# Patient Record
Sex: FEMALE | Race: BLACK OR AFRICAN AMERICAN | Employment: UNEMPLOYED | URBAN - METROPOLITAN AREA
[De-identification: names, ages, dates, MRNs, and addresses within clinical notes are randomized per-mention and may not be internally consistent; named-entity substitution may affect disease eponyms.]

---

## 2017-07-11 ENCOUNTER — APPOINTMENT (OUTPATIENT)
Dept: GENERAL RADIOLOGY | Age: 22
End: 2017-07-11

## 2017-07-11 ENCOUNTER — HOSPITAL ENCOUNTER (EMERGENCY)
Age: 22
Discharge: HOME | End: 2017-07-11
Attending: EMERGENCY MEDICINE

## 2017-07-11 VITALS
RESPIRATION RATE: 20 BRPM | TEMPERATURE: 97.9 F | BODY MASS INDEX: 31.62 KG/M2 | SYSTOLIC BLOOD PRESSURE: 158 MMHG | WEIGHT: 190 LBS | OXYGEN SATURATION: 99 % | DIASTOLIC BLOOD PRESSURE: 95 MMHG | HEART RATE: 84 BPM
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
## 2017-07-11 DIAGNOSIS — S23.9XXA THORACIC SPRAIN: ICD-10-CM

## 2017-07-11 DIAGNOSIS — R10.9 ABDOMINAL PAIN, UNSPECIFIED LOCATION: ICD-10-CM

## 2017-07-11 DIAGNOSIS — S39.012A LUMBAR STRAIN, INITIAL ENCOUNTER: Primary | ICD-10-CM

## 2017-07-11 LAB
ABSOLUTE EOS #: 0.1 K/UL (ref 0–0.4)
ABSOLUTE LYMPH #: 2.1 K/UL (ref 1–4.8)
ABSOLUTE MONO #: 0.7 K/UL (ref 0.1–1.2)
ALBUMIN SERPL-MCNC: 3.8 G/DL (ref 3.5–5.2)
ALBUMIN/GLOBULIN RATIO: 1.2 (ref 1–2.5)
ALP BLD-CCNC: 65 U/L (ref 35–104)
ALT SERPL-CCNC: 11 U/L (ref 5–33)
ANION GAP SERPL CALCULATED.3IONS-SCNC: 12 MMOL/L (ref 9–17)
AST SERPL-CCNC: 16 U/L
BASOPHILS # BLD: 0 %
BASOPHILS ABSOLUTE: 0 K/UL (ref 0–0.2)
BILIRUB SERPL-MCNC: 0.34 MG/DL (ref 0.3–1.2)
BUN BLDV-MCNC: 9 MG/DL (ref 6–20)
BUN/CREAT BLD: NORMAL (ref 9–20)
CALCIUM SERPL-MCNC: 9.1 MG/DL (ref 8.6–10.4)
CHLORIDE BLD-SCNC: 104 MMOL/L (ref 98–107)
CO2: 22 MMOL/L (ref 20–31)
CREAT SERPL-MCNC: 0.63 MG/DL (ref 0.5–0.9)
DIFFERENTIAL TYPE: ABNORMAL
EOSINOPHILS RELATIVE PERCENT: 1 %
GFR AFRICAN AMERICAN: >60 ML/MIN
GFR NON-AFRICAN AMERICAN: >60 ML/MIN
GFR SERPL CREATININE-BSD FRML MDRD: NORMAL ML/MIN/{1.73_M2}
GFR SERPL CREATININE-BSD FRML MDRD: NORMAL ML/MIN/{1.73_M2}
GLUCOSE BLD-MCNC: 78 MG/DL (ref 70–99)
HCG QUALITATIVE: NEGATIVE
HCT VFR BLD CALC: 40.4 % (ref 36–46)
HEMOGLOBIN: 12.8 G/DL (ref 12–16)
LIPASE: 15 U/L (ref 13–60)
LYMPHOCYTES # BLD: 25 %
MCH RBC QN AUTO: 23.8 PG (ref 26–34)
MCHC RBC AUTO-ENTMCNC: 31.7 G/DL (ref 31–37)
MCV RBC AUTO: 75 FL (ref 80–100)
MONOCYTES # BLD: 8 %
PDW BLD-RTO: 15.3 % (ref 12.5–15.4)
PLATELET # BLD: 294 K/UL (ref 140–450)
PLATELET ESTIMATE: ABNORMAL
PMV BLD AUTO: 9.5 FL (ref 6–12)
POTASSIUM SERPL-SCNC: 4.1 MMOL/L (ref 3.7–5.3)
RBC # BLD: 5.39 M/UL (ref 4–5.2)
RBC # BLD: ABNORMAL 10*6/UL
SEG NEUTROPHILS: 66 %
SEGMENTED NEUTROPHILS ABSOLUTE COUNT: 5.5 K/UL (ref 1.8–7.7)
SODIUM BLD-SCNC: 138 MMOL/L (ref 135–144)
TOTAL PROTEIN: 7.1 G/DL (ref 6.4–8.3)
WBC # BLD: 8.4 K/UL (ref 3.5–11)
WBC # BLD: ABNORMAL 10*3/UL

## 2017-07-11 PROCEDURE — 73080 X-RAY EXAM OF ELBOW: CPT

## 2017-07-11 PROCEDURE — 80053 COMPREHEN METABOLIC PANEL: CPT

## 2017-07-11 PROCEDURE — 72072 X-RAY EXAM THORAC SPINE 3VWS: CPT

## 2017-07-11 PROCEDURE — 72100 X-RAY EXAM L-S SPINE 2/3 VWS: CPT

## 2017-07-11 PROCEDURE — 84703 CHORIONIC GONADOTROPIN ASSAY: CPT

## 2017-07-11 PROCEDURE — 83690 ASSAY OF LIPASE: CPT

## 2017-07-11 PROCEDURE — 6360000002 HC RX W HCPCS: Performed by: EMERGENCY MEDICINE

## 2017-07-11 PROCEDURE — 99284 EMERGENCY DEPT VISIT MOD MDM: CPT

## 2017-07-11 PROCEDURE — 85025 COMPLETE CBC W/AUTO DIFF WBC: CPT

## 2017-07-11 PROCEDURE — 96374 THER/PROPH/DIAG INJ IV PUSH: CPT

## 2017-07-11 RX ORDER — KETOROLAC TROMETHAMINE 15 MG/ML
15 INJECTION, SOLUTION INTRAMUSCULAR; INTRAVENOUS ONCE
Status: COMPLETED | OUTPATIENT
Start: 2017-07-11 | End: 2017-07-11

## 2017-07-11 RX ORDER — NAPROXEN 500 MG/1
500 TABLET ORAL 2 TIMES DAILY WITH MEALS
Qty: 30 TABLET | Refills: 0 | Status: SHIPPED | OUTPATIENT
Start: 2017-07-11 | End: 2019-12-14

## 2017-07-11 RX ADMIN — KETOROLAC TROMETHAMINE 15 MG: 15 INJECTION, SOLUTION INTRAMUSCULAR; INTRAVENOUS at 10:11

## 2017-07-11 ASSESSMENT — PAIN SCALES - GENERAL
PAINLEVEL_OUTOF10: 9
PAINLEVEL_OUTOF10: 9

## 2017-07-11 ASSESSMENT — PAIN DESCRIPTION - LOCATION: LOCATION: ARM;HEAD

## 2017-07-11 ASSESSMENT — ENCOUNTER SYMPTOMS
BACK PAIN: 1
ABDOMINAL PAIN: 1
SHORTNESS OF BREATH: 0
NAUSEA: 0
VOMITING: 0
WHEEZING: 0
COLOR CHANGE: 0

## 2017-07-11 ASSESSMENT — PAIN DESCRIPTION - DESCRIPTORS: DESCRIPTORS: HEADACHE

## 2017-07-11 ASSESSMENT — PAIN DESCRIPTION - ORIENTATION: ORIENTATION: RIGHT

## 2017-07-11 ASSESSMENT — PAIN DESCRIPTION - PAIN TYPE: TYPE: ACUTE PAIN

## 2018-03-19 ENCOUNTER — HOSPITAL ENCOUNTER (EMERGENCY)
Age: 23
Discharge: LEFT BEFORE DISCHARGE | End: 2018-03-19
Attending: EMERGENCY MEDICINE
Payer: MEDICAID

## 2018-03-19 VITALS
HEART RATE: 93 BPM | SYSTOLIC BLOOD PRESSURE: 150 MMHG | DIASTOLIC BLOOD PRESSURE: 99 MMHG | OXYGEN SATURATION: 98 % | TEMPERATURE: 97.2 F | RESPIRATION RATE: 16 BRPM

## 2018-03-19 DIAGNOSIS — R10.33 PERIUMBILICAL ABDOMINAL PAIN: Primary | ICD-10-CM

## 2018-03-19 PROCEDURE — 99284 EMERGENCY DEPT VISIT MOD MDM: CPT

## 2018-03-19 RX ORDER — DICYCLOMINE HYDROCHLORIDE 10 MG/1
10 CAPSULE ORAL ONCE
Status: DISCONTINUED | OUTPATIENT
Start: 2018-03-19 | End: 2018-03-19 | Stop reason: HOSPADM

## 2018-03-19 ASSESSMENT — PAIN DESCRIPTION - LOCATION: LOCATION: ABDOMEN

## 2018-03-19 ASSESSMENT — ENCOUNTER SYMPTOMS
NAUSEA: 0
DIARRHEA: 0
ABDOMINAL PAIN: 1
VOMITING: 0
SHORTNESS OF BREATH: 0
SORE THROAT: 0
CHEST TIGHTNESS: 0

## 2018-03-19 ASSESSMENT — PAIN DESCRIPTION - ORIENTATION: ORIENTATION: MID

## 2018-03-19 ASSESSMENT — PAIN DESCRIPTION - PAIN TYPE: TYPE: ACUTE PAIN

## 2018-03-19 ASSESSMENT — PAIN DESCRIPTION - FREQUENCY: FREQUENCY: CONTINUOUS

## 2018-03-19 ASSESSMENT — PAIN SCALES - GENERAL: PAINLEVEL_OUTOF10: 5

## 2018-03-19 ASSESSMENT — PAIN DESCRIPTION - DESCRIPTORS: DESCRIPTORS: SQUEEZING;SPASM

## 2018-03-19 NOTE — ED PROVIDER NOTES
[facility]     Emergency Department     Faculty Attestation        I performed a history and physical examination of the patient and discussed management with the resident. I reviewed the residents note and agree with the documented findings and plan of care. Any areas of disagreement are noted on the chart. I was personally present for the key portions of any procedures. I have documented in the chart those procedures where I was not present during the key portions. I have reviewed the emergency nurses triage note. I agree with the chief complaint, past medical history, past surgical history, allergies, medications, social and family history as documented unless otherwise noted below. For mid-level providers such as nurse practitioners as well as physicians assistants:    I have personally seen and evaluated the patient. I find the patient's history and physical exam are consistent with NP/PA documentation. I agree with the care provided, treatment rendered, disposition, & follow-up plan. Additional findings are as noted. Vital Signs: BP (!) 150/99   Pulse 93   Temp 97.2 °F (36.2 °C) (Oral)   Resp 16   SpO2 98%   PCP:  Mauricio Queen MD    Pertinent Comments:     Abdomen soft, nontender nondistended. Patient sitting up watching TV as he presented to eat and drink. Critical Care  None         Note, if the patient's blood pressure was elevated, and they have no history of hypertension, they were informed of the following: The patient may have Pre-hypertension/Hypertension: The patient has been informed that they may have pre-hypertension or Hypertension based on a blood pressure reading in the emergency department.  I recommend that the patient call the primary care provider listed on their discharge instructions or a physician of their choice this week to arrange follow up for further evaluation of possible pre-hypertension or Hypertension. (Please note that portions of this note were completed with a voice recognition program.  Efforts were made to edit the dictations but occasionally words are mis-transcribed. )    Winfield Gaucher, MD  Attending Emergency Medicine Physician              Delilah De La Cruz MD  03/19/18 [id number]

## 2018-03-19 NOTE — ED PROVIDER NOTES
Abortions Neg Hx     Stroke Neg Hx        Allergies:  Patient has no known allergies. Home Medications:  Prior to Admission medications    Medication Sig Start Date End Date Taking? Authorizing Provider   naproxen (NAPROSYN) 500 MG tablet Take 1 tablet by mouth 2 times daily (with meals) for 30 doses 7/11/17 7/26/17  Lien Diaz MD       REVIEW OF SYSTEMS    (2-9 systems for level 4, 10 or more for level 5)      Review of Systems   Constitutional: Negative for chills, diaphoresis and fever. HENT: Negative for congestion and sore throat. Respiratory: Negative for chest tightness and shortness of breath. Cardiovascular: Negative for chest pain. Gastrointestinal: Positive for abdominal pain. Negative for diarrhea, nausea and vomiting. Genitourinary: Negative for decreased urine volume, dysuria, frequency, urgency, vaginal bleeding and vaginal discharge. Musculoskeletal: Negative for arthralgias and neck pain. Skin: Negative for rash and wound. Neurological: Negative for weakness and numbness.        PHYSICAL EXAM   (up to 7 for level 4, 8 or more for level 5)      INITIAL VITALS:   BP (!) 150/99   Pulse 93   Temp 97.2 °F (36.2 °C) (Oral)   Resp 16   SpO2 98%     Physical Exam  CONSTITUTIONAL: AOx4, NAD, cooperative with exam, afebrile   HEAD: normocephalic, atraumatic   EYES: PERRL, EOMI, anicteric sclera   ENT: Moist mucous membranes, uvula midline   NECK: Supple, symmetric, trachea midline   BACK: Symmetric, no deformity   LUNGS: Bilateral breath sounds, CTAB, no rales/ronchi/wheezes   CARDIOVASCULAR: RRR, no m/r/g, 2+ pulses throughout   ABDOMEN: Soft, non-tender, non-distended, +BS   NEUROLOGIC:  MAEx4, normal sensorium and gait; normal strength throughout   MUSCULOSKELETAL: No clubbing, cyanosis or edema   SKIN: No rash, pallor or wounds          DIFFERENTIAL  DIAGNOSIS     PLAN (LABS / IMAGING / EKG):  Orders Placed This Encounter   Procedures    Urinalysis with microscopic   

## 2018-05-19 ENCOUNTER — HOSPITAL ENCOUNTER (EMERGENCY)
Age: 23
Discharge: HOME | End: 2018-05-19
Attending: EMERGENCY MEDICINE
Payer: MEDICAID

## 2018-05-19 VITALS
WEIGHT: 210 LBS | SYSTOLIC BLOOD PRESSURE: 139 MMHG | DIASTOLIC BLOOD PRESSURE: 90 MMHG | OXYGEN SATURATION: 99 % | HEART RATE: 86 BPM | TEMPERATURE: 98.1 F | BODY MASS INDEX: 34.95 KG/M2 | RESPIRATION RATE: 12 BRPM

## 2018-05-19 DIAGNOSIS — M54.32 SCIATICA OF LEFT SIDE: Primary | ICD-10-CM

## 2018-05-19 PROCEDURE — 6360000002 HC RX W HCPCS: Performed by: STUDENT IN AN ORGANIZED HEALTH CARE EDUCATION/TRAINING PROGRAM

## 2018-05-19 PROCEDURE — 96372 THER/PROPH/DIAG INJ SC/IM: CPT

## 2018-05-19 PROCEDURE — 99283 EMERGENCY DEPT VISIT LOW MDM: CPT

## 2018-05-19 RX ORDER — IBUPROFEN 400 MG/1
800 TABLET ORAL EVERY 8 HOURS PRN
Qty: 20 TABLET | Refills: 0 | Status: SHIPPED | OUTPATIENT
Start: 2018-05-19 | End: 2019-05-22

## 2018-05-19 RX ORDER — KETOROLAC TROMETHAMINE 30 MG/ML
30 INJECTION, SOLUTION INTRAMUSCULAR; INTRAVENOUS ONCE
Status: COMPLETED | OUTPATIENT
Start: 2018-05-19 | End: 2018-05-19

## 2018-05-19 RX ORDER — CYCLOBENZAPRINE HCL 10 MG
10 TABLET ORAL ONCE
Status: DISCONTINUED | OUTPATIENT
Start: 2018-05-19 | End: 2018-05-19

## 2018-05-19 RX ORDER — CYCLOBENZAPRINE HCL 10 MG
10 TABLET ORAL 3 TIMES DAILY PRN
Qty: 20 TABLET | Refills: 0 | Status: SHIPPED | OUTPATIENT
Start: 2018-05-19 | End: 2019-12-14

## 2018-05-19 RX ADMIN — KETOROLAC TROMETHAMINE 30 MG: 30 INJECTION, SOLUTION INTRAMUSCULAR at 19:44

## 2018-05-19 ASSESSMENT — PAIN DESCRIPTION - ORIENTATION: ORIENTATION: LOWER

## 2018-05-19 ASSESSMENT — ENCOUNTER SYMPTOMS
DIARRHEA: 0
WHEEZING: 0
SHORTNESS OF BREATH: 0
ABDOMINAL PAIN: 0
BACK PAIN: 1
NAUSEA: 0
SORE THROAT: 0
COUGH: 0
CONSTIPATION: 0
VOMITING: 0
CHEST TIGHTNESS: 0

## 2018-05-19 ASSESSMENT — PAIN DESCRIPTION - PAIN TYPE: TYPE: ACUTE PAIN

## 2018-05-19 ASSESSMENT — PAIN DESCRIPTION - DESCRIPTORS: DESCRIPTORS: DISCOMFORT

## 2018-05-19 ASSESSMENT — PAIN DESCRIPTION - FREQUENCY: FREQUENCY: CONTINUOUS

## 2018-05-19 ASSESSMENT — PAIN SCALES - GENERAL
PAINLEVEL_OUTOF10: 9
PAINLEVEL_OUTOF10: 9

## 2018-05-19 ASSESSMENT — PAIN DESCRIPTION - LOCATION: LOCATION: BACK

## 2019-05-22 ENCOUNTER — HOSPITAL ENCOUNTER (EMERGENCY)
Age: 24
Discharge: HOME | End: 2019-05-23
Attending: EMERGENCY MEDICINE
Payer: COMMERCIAL

## 2019-05-22 ENCOUNTER — APPOINTMENT (OUTPATIENT)
Dept: GENERAL RADIOLOGY | Age: 24
End: 2019-05-22
Payer: COMMERCIAL

## 2019-05-22 VITALS
HEIGHT: 62 IN | SYSTOLIC BLOOD PRESSURE: 155 MMHG | OXYGEN SATURATION: 99 % | DIASTOLIC BLOOD PRESSURE: 100 MMHG | TEMPERATURE: 98 F | RESPIRATION RATE: 18 BRPM | BODY MASS INDEX: 42.33 KG/M2 | WEIGHT: 230 LBS | HEART RATE: 75 BPM

## 2019-05-22 DIAGNOSIS — M62.838 TRAPEZIUS MUSCLE SPASM: ICD-10-CM

## 2019-05-22 DIAGNOSIS — M54.6 PAIN IN THORACIC SPINE: ICD-10-CM
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
## 2019-05-22 DIAGNOSIS — M54.50 LUMBAR PAIN: ICD-10-CM

## 2019-05-22 DIAGNOSIS — S06.0X0A CONCUSSION WITHOUT LOSS OF CONSCIOUSNESS, INITIAL ENCOUNTER: ICD-10-CM

## 2019-05-22 LAB — PREGNANCY TEST URINE, POC: NEGATIVE

## 2019-05-22 PROCEDURE — 72100 X-RAY EXAM L-S SPINE 2/3 VWS: CPT

## 2019-05-22 PROCEDURE — 99284 EMERGENCY DEPT VISIT MOD MDM: CPT

## 2019-05-22 PROCEDURE — 72072 X-RAY EXAM THORAC SPINE 3VWS: CPT

## 2019-05-22 PROCEDURE — 6370000000 HC RX 637 (ALT 250 FOR IP): Performed by: EMERGENCY MEDICINE

## 2019-05-22 RX ORDER — ACETAMINOPHEN 325 MG/1
650 TABLET ORAL EVERY 6 HOURS PRN
Qty: 30 TABLET | Refills: 0 | Status: SHIPPED | OUTPATIENT
Start: 2019-05-22 | End: 2019-12-06

## 2019-05-22 RX ORDER — IBUPROFEN 400 MG/1
400 TABLET ORAL EVERY 8 HOURS PRN
Qty: 30 TABLET | Refills: 0 | Status: SHIPPED | OUTPATIENT
Start: 2019-05-22 | End: 2019-12-06

## 2019-05-22 RX ORDER — ACETAMINOPHEN 500 MG
1000 TABLET ORAL ONCE
Status: COMPLETED | OUTPATIENT
Start: 2019-05-22 | End: 2019-05-22

## 2019-05-22 RX ADMIN — ACETAMINOPHEN 1000 MG: 500 TABLET ORAL at 23:53

## 2019-05-22 ASSESSMENT — PAIN SCALES - GENERAL: PAINLEVEL_OUTOF10: 8

## 2019-05-22 ASSESSMENT — PAIN DESCRIPTION - DESCRIPTORS: DESCRIPTORS: ACHING

## 2019-05-22 ASSESSMENT — PAIN DESCRIPTION - LOCATION: LOCATION: NECK

## 2019-05-22 ASSESSMENT — PAIN DESCRIPTION - FREQUENCY: FREQUENCY: CONTINUOUS
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
## 2019-10-18 ENCOUNTER — HOSPITAL ENCOUNTER (EMERGENCY)
Age: 24
Discharge: HOME | End: 2019-10-18
Attending: EMERGENCY MEDICINE
Payer: MEDICAID

## 2019-10-18 VITALS
BODY MASS INDEX: 30.99 KG/M2 | TEMPERATURE: 98.2 F | HEIGHT: 65 IN | RESPIRATION RATE: 18 BRPM | OXYGEN SATURATION: 100 % | WEIGHT: 186 LBS | DIASTOLIC BLOOD PRESSURE: 104 MMHG | HEART RATE: 81 BPM | SYSTOLIC BLOOD PRESSURE: 148 MMHG

## 2019-10-18 DIAGNOSIS — R23.4 CHANGE OF SKIN OF BREAST: Primary | ICD-10-CM

## 2019-10-18 LAB
CHP ED QC CHECK: NORMAL
PREGNANCY TEST URINE, POC: NEGATIVE

## 2019-10-18 PROCEDURE — 6370000000 HC RX 637 (ALT 250 FOR IP): Performed by: EMERGENCY MEDICINE

## 2019-10-18 PROCEDURE — 81025 URINE PREGNANCY TEST: CPT

## 2019-10-18 PROCEDURE — 99284 EMERGENCY DEPT VISIT MOD MDM: CPT

## 2019-10-18 RX ORDER — CEPHALEXIN 500 MG/1
500 CAPSULE ORAL 4 TIMES DAILY
Qty: 28 CAPSULE | Refills: 0 | Status: SHIPPED | OUTPATIENT
Start: 2019-10-18 | End: 2019-10-25

## 2019-10-18 RX ORDER — CEPHALEXIN 500 MG/1
500 CAPSULE ORAL ONCE
Status: COMPLETED | OUTPATIENT
Start: 2019-10-18 | End: 2019-10-18

## 2019-10-18 RX ADMIN — CEPHALEXIN 500 MG: 500 CAPSULE ORAL at 07:15

## 2019-10-18 ASSESSMENT — ENCOUNTER SYMPTOMS
SHORTNESS OF BREATH: 0
COUGH: 1
CONSTIPATION: 0
TROUBLE SWALLOWING: 0
COLOR CHANGE: 1
NAUSEA: 0
DIARRHEA: 0
VOMITING: 0
PHOTOPHOBIA: 0
ABDOMINAL PAIN: 0

## 2019-10-21 LAB — HCG, PREGNANCY URINE (POC): NEGATIVE

## 2019-12-05 ENCOUNTER — HOSPITAL ENCOUNTER (EMERGENCY)
Age: 24
Discharge: LEFT BEFORE DISCHARGE | End: 2019-12-05
Payer: MEDICAID

## 2019-12-06 ENCOUNTER — APPOINTMENT (OUTPATIENT)
Dept: GENERAL RADIOLOGY | Age: 24
End: 2019-12-06
Payer: MEDICAID

## 2019-12-06 ENCOUNTER — HOSPITAL ENCOUNTER (EMERGENCY)
Age: 24
Discharge: HOME | End: 2019-12-06
Attending: EMERGENCY MEDICINE
Payer: MEDICAID

## 2019-12-06 VITALS
TEMPERATURE: 97.9 F | HEIGHT: 65 IN | WEIGHT: 186 LBS | OXYGEN SATURATION: 99 % | BODY MASS INDEX: 30.99 KG/M2 | HEART RATE: 73 BPM | DIASTOLIC BLOOD PRESSURE: 84 MMHG | RESPIRATION RATE: 16 BRPM | SYSTOLIC BLOOD PRESSURE: 132 MMHG

## 2019-12-06 DIAGNOSIS — L03.116 CELLULITIS OF LEFT FOOT: Primary | ICD-10-CM

## 2019-12-06 PROCEDURE — 6370000000 HC RX 637 (ALT 250 FOR IP): Performed by: STUDENT IN AN ORGANIZED HEALTH CARE EDUCATION/TRAINING PROGRAM

## 2019-12-06 PROCEDURE — 96372 THER/PROPH/DIAG INJ SC/IM: CPT

## 2019-12-06 PROCEDURE — 99283 EMERGENCY DEPT VISIT LOW MDM: CPT

## 2019-12-06 PROCEDURE — 73630 X-RAY EXAM OF FOOT: CPT

## 2019-12-06 PROCEDURE — 6360000002 HC RX W HCPCS: Performed by: STUDENT IN AN ORGANIZED HEALTH CARE EDUCATION/TRAINING PROGRAM

## 2019-12-06 RX ORDER — ACETAMINOPHEN 500 MG
1000 TABLET ORAL ONCE
Status: COMPLETED | OUTPATIENT
Start: 2019-12-06 | End: 2019-12-06

## 2019-12-06 RX ORDER — IBUPROFEN 600 MG/1
600 TABLET ORAL EVERY 6 HOURS PRN
Qty: 30 TABLET | Refills: 0 | Status: SHIPPED | OUTPATIENT
Start: 2019-12-06 | End: 2019-12-14

## 2019-12-06 RX ORDER — KETOROLAC TROMETHAMINE 30 MG/ML
30 INJECTION, SOLUTION INTRAMUSCULAR; INTRAVENOUS ONCE
Status: DISCONTINUED | OUTPATIENT
Start: 2019-12-06 | End: 2019-12-06

## 2019-12-06 RX ORDER — KETOROLAC TROMETHAMINE 30 MG/ML
30 INJECTION, SOLUTION INTRAMUSCULAR; INTRAVENOUS ONCE
Status: COMPLETED | OUTPATIENT
Start: 2019-12-06 | End: 2019-12-06

## 2019-12-06 RX ORDER — CEPHALEXIN 500 MG/1
500 CAPSULE ORAL 2 TIMES DAILY
Qty: 14 CAPSULE | Refills: 0 | Status: SHIPPED | OUTPATIENT
Start: 2019-12-06 | End: 2019-12-13

## 2019-12-06 RX ADMIN — ACETAMINOPHEN 1000 MG: 500 TABLET ORAL at 08:42

## 2019-12-06 RX ADMIN — KETOROLAC TROMETHAMINE 30 MG: 30 INJECTION, SOLUTION INTRAMUSCULAR; INTRAVENOUS at 08:42

## 2019-12-06 ASSESSMENT — ENCOUNTER SYMPTOMS
EYE REDNESS: 0
APNEA: 0
DIARRHEA: 0
VOMITING: 0
EYE PAIN: 0
COUGH: 0
RHINORRHEA: 0
NAUSEA: 0
BLOOD IN STOOL: 0
SINUS PAIN: 0
WHEEZING: 0
SHORTNESS OF BREATH: 0
ABDOMINAL PAIN: 0

## 2019-12-06 ASSESSMENT — PAIN DESCRIPTION - PAIN TYPE: TYPE: ACUTE PAIN

## 2019-12-06 ASSESSMENT — PAIN SCALES - GENERAL
PAINLEVEL_OUTOF10: 10
PAINLEVEL_OUTOF10: 10

## 2019-12-06 ASSESSMENT — PAIN DESCRIPTION - LOCATION: LOCATION: FOOT

## 2019-12-14 ENCOUNTER — APPOINTMENT (OUTPATIENT)
Dept: GENERAL RADIOLOGY | Age: 24
End: 2019-12-14
Payer: MEDICAID

## 2019-12-14 ENCOUNTER — HOSPITAL ENCOUNTER (EMERGENCY)
Age: 24
Discharge: HOME | End: 2019-12-14
Attending: EMERGENCY MEDICINE
Payer: MEDICAID

## 2019-12-14 VITALS
OXYGEN SATURATION: 100 % | HEIGHT: 65 IN | RESPIRATION RATE: 16 BRPM | SYSTOLIC BLOOD PRESSURE: 144 MMHG | BODY MASS INDEX: 41.73 KG/M2 | TEMPERATURE: 98.1 F | DIASTOLIC BLOOD PRESSURE: 93 MMHG | HEART RATE: 70 BPM | WEIGHT: 250.5 LBS

## 2019-12-14 DIAGNOSIS — S16.1XXA ACUTE STRAIN OF NECK MUSCLE, INITIAL ENCOUNTER: ICD-10-CM
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
## 2019-12-14 PROCEDURE — 99284 EMERGENCY DEPT VISIT MOD MDM: CPT

## 2019-12-14 PROCEDURE — 72040 X-RAY EXAM NECK SPINE 2-3 VW: CPT

## 2019-12-14 RX ORDER — CYCLOBENZAPRINE HCL 10 MG
10 TABLET ORAL NIGHTLY PRN
Qty: 10 TABLET | Refills: 0 | Status: SHIPPED | OUTPATIENT
Start: 2019-12-14 | End: 2019-12-24

## 2019-12-14 RX ORDER — IBUPROFEN 600 MG/1
600 TABLET ORAL 4 TIMES DAILY PRN
Qty: 360 TABLET | Refills: 0 | Status: SHIPPED | OUTPATIENT
Start: 2019-12-14 | End: 2020-03-01

## 2019-12-14 ASSESSMENT — PAIN DESCRIPTION - FREQUENCY: FREQUENCY: CONTINUOUS

## 2019-12-14 ASSESSMENT — PAIN SCALES - GENERAL
PAINLEVEL_OUTOF10: 8
PAINLEVEL_OUTOF10: 8

## 2019-12-14 ASSESSMENT — PAIN DESCRIPTION - DESCRIPTORS: DESCRIPTORS: ACHING;THROBBING

## 2019-12-15 ASSESSMENT — ENCOUNTER SYMPTOMS
BACK PAIN: 0
COLOR CHANGE: 0
EYE PAIN: 0
ABDOMINAL PAIN: 0
NAUSEA: 0
VOMITING: 0
WHEEZING: 0
CHOKING: 0
SHORTNESS OF BREATH: 0

## 2020-03-01 ENCOUNTER — HOSPITAL ENCOUNTER (EMERGENCY)
Age: 25
Discharge: HOME | End: 2020-03-01
Attending: EMERGENCY MEDICINE
Payer: MEDICAID

## 2020-03-01 ENCOUNTER — APPOINTMENT (OUTPATIENT)
Dept: GENERAL RADIOLOGY | Age: 25
End: 2020-03-01
Payer: MEDICAID

## 2020-03-01 VITALS
HEIGHT: 65 IN | DIASTOLIC BLOOD PRESSURE: 99 MMHG | HEART RATE: 89 BPM | WEIGHT: 155 LBS | BODY MASS INDEX: 25.83 KG/M2 | RESPIRATION RATE: 19 BRPM | TEMPERATURE: 98.7 F | SYSTOLIC BLOOD PRESSURE: 149 MMHG

## 2020-03-01 PROCEDURE — 73030 X-RAY EXAM OF SHOULDER: CPT

## 2020-03-01 PROCEDURE — 96372 THER/PROPH/DIAG INJ SC/IM: CPT

## 2020-03-01 PROCEDURE — 99283 EMERGENCY DEPT VISIT LOW MDM: CPT

## 2020-03-01 PROCEDURE — 6360000002 HC RX W HCPCS: Performed by: NURSE PRACTITIONER

## 2020-03-01 PROCEDURE — 73080 X-RAY EXAM OF ELBOW: CPT

## 2020-03-01 RX ORDER — CYCLOBENZAPRINE HCL 5 MG
5 TABLET ORAL 2 TIMES DAILY PRN
Qty: 10 TABLET | Refills: 0 | Status: SHIPPED | OUTPATIENT
Start: 2020-03-01 | End: 2020-03-11

## 2020-03-01 RX ORDER — KETOROLAC TROMETHAMINE 30 MG/ML
30 INJECTION, SOLUTION INTRAMUSCULAR; INTRAVENOUS ONCE
Status: COMPLETED | OUTPATIENT
Start: 2020-03-01 | End: 2020-03-01

## 2020-03-01 RX ORDER — IBUPROFEN 800 MG/1
800 TABLET ORAL EVERY 8 HOURS PRN
Qty: 30 TABLET | Refills: 0 | Status: SHIPPED | OUTPATIENT
Start: 2020-03-01 | End: 2020-12-18

## 2020-03-01 RX ADMIN — KETOROLAC TROMETHAMINE 30 MG: 30 INJECTION, SOLUTION INTRAMUSCULAR at 20:48

## 2020-03-01 ASSESSMENT — PAIN SCALES - GENERAL
PAINLEVEL_OUTOF10: 10
PAINLEVEL_OUTOF10: 10

## 2020-03-01 ASSESSMENT — ENCOUNTER SYMPTOMS
CHEST TIGHTNESS: 0
ABDOMINAL DISTENTION: 0
VOMITING: 0
ABDOMINAL PAIN: 0
VOICE CHANGE: 0
EYE PAIN: 0
SORE THROAT: 0
COUGH: 0
BACK PAIN: 0
PHOTOPHOBIA: 0
TROUBLE SWALLOWING: 0
DIARRHEA: 0
NAUSEA: 0
FACIAL SWELLING: 0
SHORTNESS OF BREATH: 0

## 2020-03-01 ASSESSMENT — PAIN DESCRIPTION - PAIN TYPE: TYPE: ACUTE PAIN

## 2020-03-01 ASSESSMENT — PAIN DESCRIPTION - ORIENTATION: ORIENTATION: LEFT

## 2020-03-01 ASSESSMENT — PAIN DESCRIPTION - LOCATION: LOCATION: ARM
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
## 2020-03-02 NOTE — ED PROVIDER NOTES
Patient accepted at shift change. The patient's ED course and anticipated disposition was discussed. Relevant labs and other studies were reviewed. Pending diagnostic and therapeutic workup was discussed. XR SHOULDER LEFT (MIN 2 VIEWS)   Final Result   No acute osseous abnormality involving the left shoulder         XR ELBOW LEFT (MIN 3 VIEWS)   Final Result   No acute osseous abnormality of the left elbow.                 Saray Gomez MD  03/01/20 5670

## 2020-03-02 NOTE — ED PROVIDER NOTES
[facility]  ED  Emergency Department Encounter  Advanced Practice Provider     Pt Name: Shell Stanford  MRN: [id number]  [place] 1995  Date of evaluation: 3/1/20  PCP:  No primary care provider on file. CHIEF COMPLAINT       Chief Complaint   Patient presents with    Arm Pain       HISTORY OF PRESENT ILLNESS  (Location/Symptom, Timing/Onset,Context/Setting, Quality, Duration, Modifying Factors, Severity.)      Codi Barlow is a 25 y.o. female who presents with left arm pain. Patient states she went out last night and had some margaritas and is not sure if she did something to her arm. Patient states that she woke up today and her left shoulder down through her elbow was in excruciating pain. She states she attempted Tylenol as well as Motrin with minimal relief. She put some ice on it as well. There is no obvious deformity. She cannot recall anything specifically that would have caused this pain. She has issues with range of motion due to the pain and is holding her arm at the forearm. She denies any previous injury to this arm. She denies any numbness or tingling currently. She denies any neck or back pain. PAST MEDICAL /SURGICAL / SOCIAL / FAMILY HISTORY      has a past medical history of Anemia, Hypertension, and Trauma. She denies any past surgical history at this time.     Social History     Socioeconomic History    Marital status: Single     Spouse name: Not on file    Number of children: Not on file    Years of education: Not on file    Highest education level: Not on file   Occupational History    Not on file   Social Needs    Financial resource strain: Not on file    Food insecurity:     Worry: Not on file     Inability: Not on file    Transportation needs:     Medical: Not on file     Non-medical: Not on file   Tobacco Use    Smoking status: Never Smoker    Smokeless tobacco: Never Used   Substance and Sexual Activity    Alcohol use: No    Drug use: No    Sexual activity: Not Currently     Partners: Male   Lifestyle    Physical activity:     Days per week: Not on file     Minutes per session: Not on file    Stress: Not on file   Relationships    Social connections:     Talks on phone: Not on file     Gets together: Not on file     Attends Buddhism service: Not on file     Active member of club or organization: Not on file     Attends meetings of clubs or organizations: Not on file     Relationship status: Not on file    Intimate partner violence:     Fear of current or ex partner: Not on file     Emotionally abused: Not on file     Physically abused: Not on file     Forced sexual activity: Not on file   Other Topics Concern    Not on file   Social History Narrative    Not on file       Family History   Problem Relation Age of Onset    Diabetes Maternal Grandmother     Hypertension Maternal Grandmother     Hypertension Mother     Breast Cancer Neg Hx     Cancer Neg Hx     Colon Cancer Neg Hx     Eclampsia Neg Hx     Ovarian Cancer Neg Hx      Labor Neg Hx     Spont Abortions Neg Hx     Stroke Neg Hx        Allergies:  Patient has no known allergies. Home Medications:  Prior to Admission medications    Medication Sig Start Date End Date Taking? Authorizing Provider   ibuprofen (IBU) 800 MG tablet Take 1 tablet by mouth every 8 hours as needed for Pain 3/1/20  Yes JUANY Tidwell CNP   cyclobenzaprine (FLEXERIL) 5 MG tablet Take 1 tablet by mouth 2 times daily as needed for Muscle spasms 3/1/20 3/11/20 Yes JUANY Tidwell CNP       patient's medication list has been reviewed as entered by the nursing staff. REVIEW OF SYSTEMS    (2-9 systems for level 4, 10 or more for level 5)      Review of Systems   Constitutional: Negative for chills, diaphoresis, fatigue and fever. HENT: Negative for facial swelling, sore throat, tinnitus, trouble swallowing and voice change.     Eyes: Negative for photophobia, pain and visual disturbance. Respiratory: Negative for cough, chest tightness and shortness of breath. Cardiovascular: Negative for chest pain and palpitations. Gastrointestinal: Negative for abdominal distention, abdominal pain, diarrhea, nausea and vomiting. Endocrine: Negative for polydipsia, polyphagia and polyuria. Genitourinary: Negative for dysuria and flank pain. Musculoskeletal: Positive for arthralgias (left shoulder, upper arm, and elbow pain) and myalgias (pain in left bicep). Negative for back pain, joint swelling and neck pain. Skin: Negative for pallor, rash and wound. Neurological: Negative for dizziness, syncope, numbness and headaches. Psychiatric/Behavioral: Negative for confusion and decreased concentration. PHYSICAL EXAM  (up to 7 for level 4, 8 or more for level 5)      INITIAL VITALS:  height is 5' 5\" (1.651 m) and weight is 155 lb (70.3 kg). Her temperature is 98.7 °F (37.1 °C). Her blood pressure is 149/99 (abnormal) and her pulse is 89. Her respiration is 19. Physical Exam  Vitals signs and nursing note reviewed. Constitutional:       General: She is in acute distress. Appearance: Normal appearance. She is not ill-appearing, toxic-appearing or diaphoretic. HENT:      Head: Normocephalic and atraumatic. Nose: Nose normal.      Mouth/Throat:      Mouth: Mucous membranes are moist.      Pharynx: Oropharynx is clear. Eyes:      Extraocular Movements: Extraocular movements intact. Conjunctiva/sclera: Conjunctivae normal.   Neck:      Musculoskeletal: Normal range of motion. Cardiovascular:      Rate and Rhythm: Normal rate. Pulses: Normal pulses. Heart sounds: Normal heart sounds. Pulmonary:      Effort: Pulmonary effort is normal. No respiratory distress. Breath sounds: Normal breath sounds. Musculoskeletal:      Left shoulder: She exhibits decreased range of motion, tenderness, pain and decreased strength (due to pain).  She exhibits no bony tenderness, no swelling, no effusion, no crepitus, no deformity and normal pulse. Left elbow: She exhibits decreased range of motion. She exhibits no swelling, no effusion and no deformity. Tenderness found. Medial epicondyle tenderness noted. Left wrist: Normal.      Left upper arm: She exhibits tenderness. Left forearm: Normal.      Left hand: Normal.      Comments: Patient having pain with pronation and supination of the left lower arm. Pain with abduction of the left shoulder. Pain and spasming in the posterior left shoulder as well. No erythema, warmth or swelling to any joints. No obvious deformity. Capillary refill less than 2 seconds. Decreased strength due to causing pain. Skin:     General: Skin is warm and dry. Capillary Refill: Capillary refill takes less than 2 seconds. Neurological:      Mental Status: She is alert and oriented to person, place, and time. Sensory: Sensation is intact. Motor: Motor function is intact. Coordination: Coordination is intact. Psychiatric:         Mood and Affect: Mood normal.         Behavior: Behavior normal.         Thought Content:  Thought content normal.         Judgment: Judgment normal.       PLAN (LABS / IMAGING / EKG):  Orders Placed This Encounter   Procedures    XR SHOULDER LEFT (MIN 2 VIEWS)    XR ELBOW LEFT (MIN 3 VIEWS)       MEDICATIONS ORDERED:  Orders Placed This Encounter   Medications    ketorolac (TORADOL) injection 30 mg    ibuprofen (IBU) 800 MG tablet     Sig: Take 1 tablet by mouth every 8 hours as needed for Pain     Dispense:  30 tablet     Refill:  0    cyclobenzaprine (FLEXERIL) 5 MG tablet     Sig: Take 1 tablet by mouth 2 times daily as needed for Muscle spasms     Dispense:  10 tablet     Refill:  0       Controlled Substances Monitoring:       Differential Diagnoses:  Dislocation  Fracture  Strain    DIAGNOSTIC RESULTS / EMERGENCY DEPARTMENT COURSE / MDM     Patient here Awilda Watts, [address]  [address] [facility]  [phone]    Schedule an appointment as soon as possible for a visit       [facility]  [address] [place] [address]  [phone]  Go to   As needed, If symptoms worsen      DISCHARGE MEDICATIONS:  New Prescriptions    CYCLOBENZAPRINE (FLEXERIL) 5 MG TABLET    Take 1 tablet by mouth 2 times daily as needed for Muscle spasms    IBUPROFEN (IBU) 800 MG TABLET    Take 1 tablet by mouth every 8 hours as needed for Pain       JUANY Hyatt CNP   Emergency Medicine Physician Assistant    (Please note that portions of this note were completed with a voice recognition program.  Efforts were made to edit thedictations but occasionally words are mis-transcribed.)       JUANY Hyatt CNP  03/01/20 9640

## 2020-11-19 ENCOUNTER — OFFICE VISIT (OUTPATIENT)
Dept: ORTHOPEDIC SURGERY | Age: 25
End: 2020-11-19
Payer: MEDICAID

## 2020-11-19 PROCEDURE — 99203 OFFICE O/P NEW LOW 30 MIN: CPT | Performed by: ORTHOPAEDIC SURGERY

## 2020-11-19 NOTE — PROGRESS NOTES
[facility]  [facility]  [address] [address]  Dept: [phone]    Orthopaedic New Patient    CHIEF COMPLAINT:    Chief Complaint   Patient presents with    Foot Pain     bilateral       HISTORY OF PRESENT ILLNESS:    The patient is a 22 y.o. female who is being seen as a new patient for bilateral feet pain. Patient reports that approximately 4 months ago she noticed pain began at the plantar aspect of her foot at the site of her callus. Patient states that she has a family history of foot calluses which were treated with debridement. Denies any previous orthopedic trauma. Patient used to work at [employer] but had to quit due to the significant pain in her bilateral feet while on them. Denies any numbness/tingling. Denies history of diabetes. Otherwise, patient has not orthopedic complaints at this time. Past Medical History:    Past Medical History:   Diagnosis Date    Anemia 4/2/2014    Hypertension 4/30/2014    Trauma     slip/fall in pregnancy       Past Surgical History:    No past surgical history on file. Current Medications:   Current Outpatient Medications   Medication Sig Dispense Refill    ibuprofen (IBU) 800 MG tablet Take 1 tablet by mouth every 8 hours as needed for Pain 30 tablet 0     Current Facility-Administered Medications   Medication Dose Route Frequency Provider Last Rate Last Dose    medroxyPROGESTERone (DEPO-PROVERA) injection 150 mg  150 mg Intramuscular Q3 Months Kemar Hua DO   150 mg at 12/07/15 1609    medroxyPROGESTERone (DEPO-PROVERA) injection 150 mg  150 mg Intramuscular See Admin Instructions Korina Lepe DO   150 mg at 04/08/15 1132       Allergies:    Patient has no known allergies.     Social History:   Social History     Socioeconomic History    Marital status: Single     Spouse name: Not on file    Number of children: Not on file    Years of education: Not on file    Highest education level: Not on file   Occupational History    Not on file   Social Needs    Financial resource strain: Not on file    Food insecurity     Worry: Not on file     Inability: Not on file    Transportation needs     Medical: Not on file     Non-medical: Not on file   Tobacco Use    Smoking status: Never Smoker    Smokeless tobacco: Never Used   Substance and Sexual Activity    Alcohol use: No    Drug use: No    Sexual activity: Not Currently     Partners: Male   Lifestyle    Physical activity     Days per week: Not on file     Minutes per session: Not on file    Stress: Not on file   Relationships    Social connections     Talks on phone: Not on file     Gets together: Not on file     Attends Alevism service: Not on file     Active member of club or organization: Not on file     Attends meetings of clubs or organizations: Not on file     Relationship status: Not on file    Intimate partner violence     Fear of current or ex partner: Not on file     Emotionally abused: Not on file     Physically abused: Not on file     Forced sexual activity: Not on file   Other Topics Concern    Not on file   Social History Narrative    Not on file       Family History:  Family History   Problem Relation Age of Onset    Diabetes Maternal Grandmother     Hypertension Maternal Grandmother     Hypertension Mother     Breast Cancer Neg Hx     Cancer Neg Hx     Colon Cancer Neg Hx     Eclampsia Neg Hx     Ovarian Cancer Neg Hx      Labor Neg Hx     Spont Abortions Neg Hx     Stroke Neg Hx      REVIEW OF SYSTEMS:  Review of Systems    Gen: no fever, chills, malaise  CV: no chest pain or palpitations  Resp: no cough or shortness of breath  GI: no nausea, vomiting, diarrhea, or constipation  Neuro: no numbness, tingling, or weakness  Msk: Myalgias to bilateral feet  10 remaining systems reviewed and negative    PHYSICAL EXAM:  There were no vitals taken for this visit. There is no height or weight on file to calculate BMI.  Physical Exam  Gen: alert and oriented, no acute distress  Psych: Appropriate affect; Appropriate knowledge base; Appropriate mood; No hallucinations  Head: normocephalic atraumatic   Chest: symmetric chest excursion  Ortho Exam  MSK: Bilateral feet:  Callus formation noted at the plantar aspect of the fourth metatarsal head. Tenderness to palpation at callus sites. Patient is unable to maintain one legged heel raise to bilateral feet. Too many toe sign present bilaterally. Collapse of arches noted to bilateral feet upon weightbearing. Approximately 20 degrees supination noted with correction of hindfoot valgus. Passive ankle dorsiflexion to approximately 20 degrees. DP pulse 2+. EHL/FHL/TA/GS motor complex intact. Sural, saphenous, superficial/deep peroneal, and plantar nerve distributions remain intact to light touch. Radiology:   History: Bilateral feet pain    Comparison: None    Findings: AP, lateral, oblique views of the left foot showing no obvious fractures or lucencies. Deformity noted at the distal phalanx of the third through fifth toes in adduction. No radiopaque foreign bodies or masses are appreciated. Impression: Normal x-ray of the foot     History: Bilateral feet pain    Comparison: None    Findings: AP, lateral, oblique views of the right foot showing no obvious fractures or lucencies. Deformity noted at the distal phalanx of the third through fifth toes in adduction. No radiopaque foreign bodies or masses are appreciated. Impression: Normal x-ray of the foot    ASSESSMENT:  22 y.o. female with bilateral pes planus with valgus deformity with metatarsal callus formation    PLAN:     She is here today for evaluation of bilateral feet pain. On physical exam, patient is noted to have flatfoot deformity to bilateral feet on weightbearing. Her calluses at the plantar aspect of her foot appear to be in relation to this flatfoot deformity due to unequal pressure distribution. Regarding this, I had a in length discussion with the patient that debridement of calluses may be of benefit. I also discussed that a custom built orthotic may help restore her arch and relieve the pressure on her metatarsal head where her calluses reside. Patient demonstrate understanding of this. I prescribed a referral to podiatry for further eval.  Patient may follow with us as needed. She was amenable to all recommendations and was encouraged to call the office with any questions. No follow-ups on file. No orders of the defined types were placed in this encounter. No orders of the defined types were placed in this encounter.        Electronically signed by Angie Chau DO on11/19/2020 at 3:17 PM

## 2020-12-18 ENCOUNTER — OFFICE VISIT (OUTPATIENT)
Dept: INTERNAL MEDICINE | Age: 25
End: 2020-12-18
Payer: MEDICAID

## 2020-12-18 VITALS
WEIGHT: 262 LBS | DIASTOLIC BLOOD PRESSURE: 82 MMHG | HEART RATE: 87 BPM | BODY MASS INDEX: 43.65 KG/M2 | SYSTOLIC BLOOD PRESSURE: 118 MMHG | HEIGHT: 65 IN

## 2020-12-18 PROCEDURE — 1036F TOBACCO NON-USER: CPT | Performed by: STUDENT IN AN ORGANIZED HEALTH CARE EDUCATION/TRAINING PROGRAM

## 2020-12-18 PROCEDURE — G8427 DOCREV CUR MEDS BY ELIG CLIN: HCPCS | Performed by: STUDENT IN AN ORGANIZED HEALTH CARE EDUCATION/TRAINING PROGRAM

## 2020-12-18 PROCEDURE — G8484 FLU IMMUNIZE NO ADMIN: HCPCS | Performed by: STUDENT IN AN ORGANIZED HEALTH CARE EDUCATION/TRAINING PROGRAM

## 2020-12-18 PROCEDURE — 99203 OFFICE O/P NEW LOW 30 MIN: CPT | Performed by: STUDENT IN AN ORGANIZED HEALTH CARE EDUCATION/TRAINING PROGRAM

## 2020-12-18 PROCEDURE — G8417 CALC BMI ABV UP PARAM F/U: HCPCS | Performed by: STUDENT IN AN ORGANIZED HEALTH CARE EDUCATION/TRAINING PROGRAM

## 2020-12-18 RX ORDER — FAMOTIDINE 40 MG/1
40 TABLET, FILM COATED ORAL EVERY EVENING
Qty: 30 TABLET | Refills: 3 | Status: SHIPPED | OUTPATIENT
Start: 2020-12-18 | End: 2021-10-01

## 2020-12-18 ASSESSMENT — PATIENT HEALTH QUESTIONNAIRE - PHQ9
SUM OF ALL RESPONSES TO PHQ QUESTIONS 1-9: 0
SUM OF ALL RESPONSES TO PHQ QUESTIONS 1-9: 0
DEPRESSION UNABLE TO ASSESS: PT REFUSES
SUM OF ALL RESPONSES TO PHQ QUESTIONS 1-9: 0
2. FEELING DOWN, DEPRESSED OR HOPELESS: 0
1. LITTLE INTEREST OR PLEASURE IN DOING THINGS: 0
SUM OF ALL RESPONSES TO PHQ9 QUESTIONS 1 & 2: 0

## 2020-12-18 NOTE — PROGRESS NOTES
Patient is here to establish care. Main concerns are her obesity. She has poor diet choices. 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 She is trying to make some changes to her diet. She has fatigue. She has a history of gestational hypertension but has never had diabetes. She is  2 para 1 with one last pregnancy. She says her menstrual cycles are regular. Last Pap smear was 5 years ago. She recently had STD screening at [facility] which she states was negative. She uses barrier contraception. She was on Depo-Provera for a year but stopped. She has some symptoms consistent with obstructive sleep apnea. She is also complaining of GERD. Will obtain sleep study. Routine labs. She is hesitant about all vaccinations and definitely refusing flu shot. She will may consider HPV vaccine. We will give her some information to review. Advised to follow-up with GYN for her Pap smear. We will see her back in a few months to discuss her weight after the changes she has made. Attending Physician Statement  I have discussed the care of Codi Barlow, including pertinent history and exam findings,  with the resident. I have seen and examined the patient and the key elements of all parts of the encounter have been performed by me. I agree with the assessment, plan and orders as documented by the resident.   (GC Modifier)

## 2020-12-18 NOTE — PROGRESS NOTES
[facility]    New Patient Note/History and Physical    Date of patient's visit: 12/18/2020    Name:  Mor Smiley      YOB: 1995    Patient Care Team:  Rachelle Boyd MD as Obstetrician (Perinatology)    REASON FOR VISIT: First Visit, establish care     HISTORY OF PRESENTING ILLNESS:    History was obtained from the patient. Codi Barlow is a 22 y.o. is here to establish care. Her only complaints today is reflux symptoms. She was recently been seen at [address] [place] [address]. Past medical history significant for gestational hypertension, morbid obesity, STIs. She is on currently on any medications. She is sexually active and occasionally uses condoms. [facility] were [facility]. She is refusing vaccines and is due for several including HPV, Tdap, flu vaccine. She is also due for cervical cancer screening. She has a strong family history of high blood pressure. She has no history of diabetes, thyroid disease. She is worried about her weight and has been trying to lose it. She is cutting back on soda and chips. Stopped taking sertraline 3 weeks previously. She cooks her own meals. Only 1 meal per day is composed of any sort of vegetable. She denies any symptoms of constipation, fatigue. She does trouble sleeping, sometimes excessive. She does snore loudly and has excessive daytime somnolence. She has a stop bang score of 3. She has regular menstrual cycles and does not bleed heavily. Menses last for 5 days. PAST MEDICAL AND SURGICAL HISTORY:          Diagnosis Date    Anemia 4/2/2014    Hypertension 4/30/2014    Trauma     slip/fall in pregnancy       History reviewed. No pertinent surgical history. SOCIAL HISTORY:    TOBACCO:   reports that she has never smoked. She has never used smokeless tobacco.  ETOH:   reports no history of alcohol use. DRUGS:  reports no history of drug use. OCCUPATION:      ALLERGIES:    No Known Allergies      HOME MEDICATION:      No current outpatient medications on file prior to visit. Current Facility-Administered Medications on File Prior to Visit   Medication Dose Route Frequency Provider Last Rate Last Admin    medroxyPROGESTERone (DEPO-PROVERA) injection 150 mg  150 mg Intramuscular Q3 Months Lyndia Brittle, DO   150 mg at 12/07/15 1609    medroxyPROGESTERone (DEPO-PROVERA) injection 150 mg  150 mg Intramuscular See Admin Instructions Korina Lepe, DO   150 mg at 04/08/15 1132       FAMILY HISTORY:          Problem Relation Age of Onset    Diabetes Maternal Grandmother     Hypertension Maternal Grandmother     Hypertension Mother     Breast Cancer Neg Hx     Cancer Neg Hx     Colon Cancer Neg Hx     Eclampsia Neg Hx     Ovarian Cancer Neg Hx      Labor Neg Hx     Spont Abortions Neg Hx     Stroke Neg Hx        REVIEW OF SYSTEMS:    · General: no significant weight changes. · Dermatological: no abnormal pigmentation, rash, or itching. · Ears/Nose/Throat: denies any hearing loss, abnormal smelling or throat pain  · Respiratory: no cough, pleuritic chest pain, dyspnea, or wheezing  · Cardiovascular: no pain, dyspnea on exertion, orthopnea, palpitations, or claudication  · Gastrointestinal: no nausea, vomiting, heartburn, diarrhea, constipation, bloating, or abdominal pain. No bloody or black stools. · Genito-Urinary: no urinary urgency, frequency, dysuria, nocturia, hesitancy, incontinence, or pain. No hematuria  · Musculoskeletal: no arthralgia, myalgia, weakness, or morning stiffness  · Neurologic: no TIA or stroke symptoms. no paralysis, or frequent or severe headaches  · Hematologic/Lymphatic/Immunologic: no abnormal bleeding/bruising, fever, chills, night sweats orswollen glands.   · Endocrine: no heat or cold intolerance and no polyuria        PHYSICAL EXAM:      Vitals: 12/18/20 1013 12/18/20 1045   BP: (!) 141/99 118/82   Pulse: 91 87   Weight: 262 lb (118.8 kg)    Height: 5' 5\" (1.651 m)      General appearance - alert, well appearing, morbidly obese, and in no distress  Mental status - alert, oriented to person, place, and time  Mouth - mucous membranes moist, pharynx normal without lesions  Neck -  Wide neck, supple, no significant adenopathy  Lymphatics - no palpable lymphadenopathy, no hepatosplenomegaly  Chest - clear to auscultation, no wheezes, rales or rhonchi, symmetric air entry  Heart - normal rate, regular rhythm, normal S1, S2, no murmurs, rubs, clicks or gallops  Abdomen - obese, soft, nontender, nondistended, no masses or organomegaly  Musculoskeletal - no joint tenderness, deformity or swelling  Extremities - peripheral pulses normal, no pedal edema, no clubbing or cyanosis  Skin - normal coloration and turgor, no rashes, no suspicious skin lesions noted    LABORATORY FINDINGS:    CBC:   Lab Results   Component Value Date    WBC 8.4 07/11/2017    HGB 12.8 07/11/2017     07/11/2017     BMP:    Lab Results   Component Value Date     07/11/2017    K 4.1 07/11/2017     07/11/2017    CO2 22 07/11/2017    BUN 9 07/11/2017    CREATININE 0.63 07/11/2017    GLUCOSE 78 07/11/2017     Hemoglobin A1C: No results found for: LABA1C  Lipid profile: No results found for: CHOL, TRIG, HDL  Thyroid functions: No results found for: TSH   Hepatic functions:   Lab Results   Component Value Date    ALT 11 07/11/2017    AST 16 07/11/2017    PROT 7.1 07/11/2017    BILITOT 0.34 07/11/2017    LABALBU 3.8 07/11/2017     ASSESSMENT AND PLAN:   Daja Wood was seen today for new patient, health maintenance and discuss medications. Diagnoses and all orders for this visit:    Morbid obesity [facility])  -     [address]    Suspected sleep apnea  -     Baseline Diagnostic Sleep Study; Future  -     Sleep Study with PAP Titration;  Future Gastroesophageal reflux disease, unspecified whether esophagitis present  -     famotidine (PEPCID) 40 MG tablet; Take 1 tablet by mouth every evening    Microcytosis  -     CBC; Future    Encounter to establish care  -     TSH With Reflex Ft4; Future  -     Lipid Panel; Future    Screening for diabetes mellitus  -     Hemoglobin A1C; Future      She has agreed to consider getting her vaccines as well as cervical cancer screening, labs. INSTRUCTIONS:   Return in about 3 months (around 3/18/2021). · Codi received counseling on the following healthy behaviors: nutrition, exercise and tobacco cessation    · Reviewed prior labs and health maintenance. · Discussed use, benefit, and side effects of prescribed medications. Barriers to medication compliance addressed. All patient questions answered. Pt voiced understanding.      · Patient given educational materials - see patient instructions    Floyd Santo MD  PGY-3 Resident  Internal Medicine  [facility]  12/18/2020  11:30 AM

## 2020-12-18 NOTE — PATIENT INSTRUCTIONS
Medications e-scribe to pharmacy of pt's choice. Labs given to patient, they will have them done before their next visit. Referral to Nutrition Services was placed, summary of care printed and faxed to office, phone numbers given to the patient, they will contact office for an appt     Order for Sleep Study given to patient, Patient will contact them to set up an appt. Please call [phone] or [phone]. Patient was put on a wait list for 3 months and will be contacted to schedule their next follow up appointment once the schedule is available. If the patient is in need of an appointment before their next visit please call the office at [phone]. After Visit Summary  given and reviewed.

## 2021-03-01 ENCOUNTER — TELEPHONE (OUTPATIENT)
Dept: INTERNAL MEDICINE | Age: 26
End: 2021-03-01

## 2021-03-29 ENCOUNTER — TELEPHONE (OUTPATIENT)
Dept: INTERNAL MEDICINE | Age: 26
End: 2021-03-29

## 2021-03-29 NOTE — LETTER
[address]  [address] [facility] [address]  Phone: [phone]  Fax: [phone]    Kate Benson MD        March 29, 2021    [facility]      Dear Marybel Lowe: This letter is a reminder that you may have diagnostic testing that has not been completed. It is important to your well-being that these test(s) are performed. Please find the outstanding test(s) attached. If you could please have these completed before your next appointment. You can have the test completed at any [facility] facility or Lab. Please see the order for scheduling instructions. Any testing that needs completed other than blood work or xray's please call [phone] to schedule an appointment. Otherwise can be done at any [facility]. Please call our office at Dept: [phone] for additional information on the outstanding tests or let us know if they have been completed so we may update your chart. If you have any questions or concerns, please don't hesitate to call.     Sincerely,        Kate Benson MD

## 2021-07-07 ENCOUNTER — TELEPHONE (OUTPATIENT)
Dept: INTERNAL MEDICINE | Age: 26
End: 2021-07-07

## 2021-10-01 ENCOUNTER — APPOINTMENT (OUTPATIENT)
Dept: GENERAL RADIOLOGY | Age: 26
End: 2021-10-01
Payer: MEDICAID

## 2021-10-01 ENCOUNTER — HOSPITAL ENCOUNTER (EMERGENCY)
Age: 26
Discharge: HOME | End: 2021-10-01
Attending: EMERGENCY MEDICINE
Payer: MEDICAID

## 2021-10-01 VITALS
HEIGHT: 64 IN | DIASTOLIC BLOOD PRESSURE: 109 MMHG | BODY MASS INDEX: 35.85 KG/M2 | HEART RATE: 87 BPM | OXYGEN SATURATION: 95 % | RESPIRATION RATE: 24 BRPM | WEIGHT: 210 LBS | SYSTOLIC BLOOD PRESSURE: 148 MMHG | TEMPERATURE: 97.3 F

## 2021-10-01 DIAGNOSIS — M25.561 ACUTE PAIN OF RIGHT KNEE: Primary | ICD-10-CM

## 2021-10-01 PROCEDURE — 73562 X-RAY EXAM OF KNEE 3: CPT

## 2021-10-01 PROCEDURE — 6370000000 HC RX 637 (ALT 250 FOR IP): Performed by: STUDENT IN AN ORGANIZED HEALTH CARE EDUCATION/TRAINING PROGRAM

## 2021-10-01 PROCEDURE — 99283 EMERGENCY DEPT VISIT LOW MDM: CPT

## 2021-10-01 RX ORDER — ACETAMINOPHEN 500 MG
1000 TABLET ORAL 3 TIMES DAILY
Qty: 30 TABLET | Refills: 0 | Status: SHIPPED | OUTPATIENT
Start: 2021-10-01 | End: 2022-02-07 | Stop reason: SDUPTHER

## 2021-10-01 RX ORDER — ACETAMINOPHEN 500 MG
1000 TABLET ORAL ONCE
Status: COMPLETED | OUTPATIENT
Start: 2021-10-01 | End: 2021-10-01

## 2021-10-01 RX ORDER — IBUPROFEN 800 MG/1
800 TABLET ORAL ONCE
Status: COMPLETED | OUTPATIENT
Start: 2021-10-01 | End: 2021-10-01

## 2021-10-01 RX ORDER — IBUPROFEN 800 MG/1
800 TABLET ORAL 2 TIMES DAILY PRN
Qty: 30 TABLET | Refills: 0 | Status: SHIPPED | OUTPATIENT
Start: 2021-10-01 | End: 2022-02-07 | Stop reason: SDUPTHER

## 2021-10-01 RX ADMIN — IBUPROFEN 800 MG: 800 TABLET, FILM COATED ORAL at 15:37

## 2021-10-01 RX ADMIN — ACETAMINOPHEN 1000 MG: 500 TABLET ORAL at 15:37

## 2021-10-01 ASSESSMENT — PAIN DESCRIPTION - PAIN TYPE: TYPE: ACUTE PAIN

## 2021-10-01 ASSESSMENT — PAIN DESCRIPTION - LOCATION: LOCATION: KNEE

## 2021-10-01 ASSESSMENT — PAIN DESCRIPTION - FREQUENCY: FREQUENCY: CONTINUOUS

## 2021-10-01 ASSESSMENT — PAIN SCALES - GENERAL
PAINLEVEL_OUTOF10: 10
PAINLEVEL_OUTOF10: 10

## 2021-10-01 ASSESSMENT — PAIN DESCRIPTION - DESCRIPTORS: DESCRIPTORS: TIGHTNESS;TENDER;ACHING

## 2021-10-01 ASSESSMENT — PAIN DESCRIPTION - ORIENTATION: ORIENTATION: RIGHT

## 2021-10-01 NOTE — ED PROVIDER NOTES
[facility] ED  Emergency Department Encounter  EmergencyMedicine Resident     Pt Name:Codi Estevez  MRN: [id number]  Birthdate 1995  Date of evaluation: 10/1/21  PCP:  Gilbert Moyer MD    CHIEF COMPLAINT       Chief Complaint   Patient presents with    Knee Pain     R knee       HISTORY OF PRESENT ILLNESS  (Location/Symptom, Timing/Onset, Context/Setting, Quality, Duration, Modifying Factors, Severity.)      Aisha Gonzalez is a 32 y.o. female who presents with chief complaint fell over her dog prior to arrival, dog was biting her she fell backwards tripped landed on her leg, was asymptomatic at first however when she began walking she felt pops and clicks in her knee and knee pain 10/10 sharp worse with movement versus rest nonradiating. Not taking any medications prior to arrival.  Denies blood thinners denying hitting her head loss of consciousness headache dizziness shortness of breath chest pain nausea vomiting fevers diarrhea. PAST MEDICAL / SURGICAL / SOCIAL / FAMILY HISTORY      has a past medical history of Anemia, Hypertension, and Trauma. has no past surgical history on file.   Denies    Social History     Socioeconomic History    Marital status: Single     Spouse name: Not on file    Number of children: Not on file    Years of education: Not on file    Highest education level: Not on file   Occupational History    Not on file   Tobacco Use    Smoking status: Never Smoker    Smokeless tobacco: Never Used   Vaping Use    Vaping Use: Some days   Substance and Sexual Activity    Alcohol use: No    Drug use: No    Sexual activity: Not Currently     Partners: Male   Other Topics Concern    Not on file   Social History Narrative    Not on file     Social Determinants of Health     Financial Resource Strain:     Difficulty of Paying Living Expenses:    Food Insecurity:     Worried About Running Out of Food in the Last Year:     [address] in the Last Year:    Transportation Needs:     Lack of Transportation (Medical):  Lack of Transportation (Non-Medical):    Physical Activity:     Days of Exercise per Week:     Minutes of Exercise per Session:    Stress:     Feeling of Stress :    Social Connections:     Frequency of Communication with Friends and Family:     Frequency of Social Gatherings with Friends and Family:     Attends Taoist Services:     Active Member of Clubs or Organizations:     Attends Club or Organization Meetings:     Marital Status:    Intimate Partner Violence:     Fear of Current or Ex-Partner:     Emotionally Abused:     Physically Abused:     Sexually Abused:        Family History   Problem Relation Age of Onset    Diabetes Maternal Grandmother     Hypertension Maternal Grandmother     Hypertension Mother     Breast Cancer Neg Hx     Cancer Neg Hx     Colon Cancer Neg Hx     Eclampsia Neg Hx     Ovarian Cancer Neg Hx      Labor Neg Hx     Spont Abortions Neg Hx     Stroke Neg Hx        Allergies:  Patient has no known allergies. Home Medications:  Prior to Admission medications    Medication Sig Start Date End Date Taking? Authorizing Provider   acetaminophen (TYLENOL) 500 MG tablet Take 2 tablets by mouth 3 times daily 10/1/21  Yes Sonia Guillen MD   ibuprofen (ADVIL;MOTRIN) 800 MG tablet Take 1 tablet by mouth 2 times daily as needed for Pain 10/1/21  Yes Sonia Guillen MD       REVIEW OF SYSTEMS    (2-9 systems for level 4, 10 or more for level 5)      Review of Systems   Constitutional: Negative for fever. HENT: Negative for congestion. Eyes: Negative for photophobia. Respiratory: Negative for shortness of breath. Cardiovascular: Negative for chest pain. Gastrointestinal: Negative for abdominal pain and vomiting. Endocrine: Negative for polyuria. Genitourinary: Negative for dysuria. Musculoskeletal: Positive for arthralgias. Skin: Negative for color change. Allergic/Immunologic: Negative for immunocompromised state. Neurological: Negative for dizziness. Hematological: Does not bruise/bleed easily. Psychiatric/Behavioral: Negative for agitation. PHYSICAL EXAM   (up to 7 for level 4, 8 or more for level 5)      INITIAL VITALS:   BP (!) 148/109   Pulse 87   Temp 97.3 °F (36.3 °C) (Oral)   Resp 24   Ht 5' 4\" (1.626 m)   Wt 210 lb (95.3 kg)   LMP 09/12/2021   SpO2 95%   BMI 36.05 kg/m²     Physical Exam  Constitutional:       General: Not in acute distress. Appearance: Normal appearance. Obese weight. Not toxic-appearing. HENT:      Head: Normocephalic and atraumatic. Nose: Nose normal.      Mouth/Throat: Mucous membranes are moist.  Uvula midline no oropharyngeal edema. Pharynx: Oropharynx is clear. Eyes:      Extraocular Movements: Extraocular movements intact. Conjunctiva/sclera: Conjunctivae normal.      Pupils: Pupils are equal, round, and reactive to light. Neck:      Musculoskeletal: Normal range of motion and neck supple. No neck rigidity. Cardiovascular:      Rate and Rhythm: Normal rate and regular rhythm. Pulses: Normal pulses. Heart sounds: Normal heart sounds. No murmur. Pulmonary:      Effort: Pulmonary effort is normal.      Breath sounds: Normal breath sounds. No wheezing. Abdominal:      General: Abdomen is flat. Bowel sounds are normal.      Tenderness: There is no abdominal tenderness. Musculoskeletal:     Normal range of motion. General: Tenderness palpation over anterior knee on the right side. No effusions no joint laxity negative anterior posterior drawer test however patient does have grinding pain on axial loading. Skin:     General: Skin is warm. Capillary Refill: Capillary refill takes less than 2 seconds. Coloration: Skin is not jaundiced. Neurological:      General: No focal deficit present.       Mental Status: Alert and oriented to person, place, and time. Mental status is at baseline. Motor: No weakness. DIFFERENTIAL  DIAGNOSIS     PLAN (LABS / IMAGING / EKG):  Orders Placed This Encounter   Procedures    XR KNEE RIGHT (3 VIEWS)       MEDICATIONS ORDERED:  Orders Placed This Encounter   Medications    acetaminophen (TYLENOL) tablet 1,000 mg    ibuprofen (ADVIL;MOTRIN) tablet 800 mg    acetaminophen (TYLENOL) 500 MG tablet     Sig: Take 2 tablets by mouth 3 times daily     Dispense:  30 tablet     Refill:  0    ibuprofen (ADVIL;MOTRIN) 800 MG tablet     Sig: Take 1 tablet by mouth 2 times daily as needed for Pain     Dispense:  30 tablet     Refill:  0           DIAGNOSTIC RESULTS / EMERGENCY DEPARTMENT COURSE / MDM     LABS:  No results found for this visit on 10/01/21. RADIOLOGY:  XR KNEE RIGHT (3 VIEWS)    Result Date: 10/1/2021  EXAMINATION: THREE XRAY VIEWS OF THE RIGHT KNEE 10/1/2021 4:12 pm COMPARISON: None. HISTORY: ORDERING SYSTEM PROVIDED HISTORY: knee pain after fall TECHNOLOGIST PROVIDED HISTORY: knee pain after fall FINDINGS: Mineralization and alignment are satisfactory. No acute fracture, dislocation or gross effusion is noted. No acute osseous abnormality. EKG  None    All EKG's are interpreted by the Emergency Department Physician who either signs or Co-signs this chart in the absence of a cardiologist.    EMERGENCY DEPARTMENT COURSE:  Patient breathing quietly and unlabored on room air. Speech is normal and speaking in full sentences without requiring to pause to take a breath. Physical exam as above, no external signs of trauma however pain on axial loading edema concern for meniscal tear compartments soft neurovascular intact pulses distally intact. Will x-ray to rule out bony deformity, anti-inflammatory pain control. Will need follow-up with PCP, Ortho. Imaging negative. Pain is improved. Will Ace wrap provide crutches.   Patient follow-up with Ortho.    PROCEDURES:  None    CONSULTS:  None    CRITICAL CARE:  None    FINAL IMPRESSION      1.  Acute pain of right knee          DISPOSITION / PLAN     DISPOSITION Decision To Discharge 10/01/2021 04:21:20 PM      PATIENT REFERRED TO:  Carlyle Maloney MD  [address]  [phone]    Schedule an appointment as soon as possible for a visit       [place] [address], [facility]  [phone]  Schedule an appointment as soon as possible for a visit in 2 days        DISCHARGE MEDICATIONS:  Discharge Medication List as of 10/1/2021  4:22 PM      START taking these medications    Details   acetaminophen (TYLENOL) 500 MG tablet Take 2 tablets by mouth 3 times daily, Disp-30 tablet, R-0Print      ibuprofen (ADVIL;MOTRIN) 800 MG tablet Take 1 tablet by mouth 2 times daily as needed for Pain, Disp-30 tablet, R-0Print             Sonia Guillen MD  Emergency Medicine Resident    (Please note that portions of thisnote were completed with a voice recognition program.  Efforts were made to edit the dictations but occasionally words are mis-transcribed.)       Sonia Guillen MD  Resident  10/01/21 1640

## 2021-10-01 NOTE — ED NOTES
Discharge instructions and medication list reviewed with patient. All questions answered at this time.     pt given crutches instructions     Terrell Rendon RN  10/01/21 7418

## 2021-10-01 NOTE — ED NOTES
Patient presents to ED for R knee pain s/p falling in her home when she tripped over her dog. Denies hitting head or any LOC. States she was walking and when she attempted to turn around is when the dog collided with her, knocking her sideways onto the floor. No arm/hand/wrist involvement. Patient states she heard and felt a pop in the knee. Patient states that she was then retrieving the garbage cans and felt the knee pop again, causing her to fall into the street. States current pain in the knee 10/10.      Fran Hatchet, TITO  77/34/34 8008

## 2021-10-07 ENCOUNTER — OFFICE VISIT (OUTPATIENT)
Dept: ORTHOPEDIC SURGERY | Age: 26
End: 2021-10-07
Payer: MEDICAID

## 2021-10-07 VITALS — HEIGHT: 65 IN | BODY MASS INDEX: 45.65 KG/M2 | WEIGHT: 274 LBS

## 2021-10-07 DIAGNOSIS — S83.411A SPRAIN OF MEDIAL COLLATERAL LIGAMENT OF RIGHT KNEE, INITIAL ENCOUNTER: Primary | ICD-10-CM

## 2021-10-07 PROCEDURE — 1036F TOBACCO NON-USER: CPT | Performed by: STUDENT IN AN ORGANIZED HEALTH CARE EDUCATION/TRAINING PROGRAM

## 2021-10-07 PROCEDURE — G8417 CALC BMI ABV UP PARAM F/U: HCPCS | Performed by: STUDENT IN AN ORGANIZED HEALTH CARE EDUCATION/TRAINING PROGRAM

## 2021-10-07 PROCEDURE — G8484 FLU IMMUNIZE NO ADMIN: HCPCS | Performed by: STUDENT IN AN ORGANIZED HEALTH CARE EDUCATION/TRAINING PROGRAM

## 2021-10-07 PROCEDURE — 99213 OFFICE O/P EST LOW 20 MIN: CPT | Performed by: STUDENT IN AN ORGANIZED HEALTH CARE EDUCATION/TRAINING PROGRAM

## 2021-10-07 PROCEDURE — G8427 DOCREV CUR MEDS BY ELIG CLIN: HCPCS | Performed by: STUDENT IN AN ORGANIZED HEALTH CARE EDUCATION/TRAINING PROGRAM

## 2021-10-07 NOTE — PROGRESS NOTES
[facility]  [facility]  [address] [facility] [address] [facility] [address]  Dept: [phone]    Ambulatory Orthopedic Office Visit    CHIEF COMPLAINT:    Chief Complaint   Patient presents with    Knee Pain     right for approx 1 week      HISTORY OF PRESENT ILLNESS:      The patient is a 32 y. o.female who is being seen at the request of  Catalina Dwyer MD for consultation and evaluation of right knee pain. Patient states on 10/1/2021 her new puppy caused her to fall sustaining a injury to her right knee with a subjective pop. Patient was able to ambulate after the incident but a few hours later was ambulating, felt another pop, and had increased pain to the right knee. She denies previous injury to the right knee in the past. She states there was no significant swelling after both incidents. She states she has been treating the injury with consistent ibuprofen, ace wrap and crutch use and feels this has slowly improved. She denies interval fevers, chills, nausea, vomiting, CP, SOB, numbness or tingling in the affected extremity. Past Medical History:    Past Medical History:   Diagnosis Date    Anemia 4/2/2014    Hypertension 4/30/2014    Trauma     slip/fall in pregnancy     Past Surgical History:    No past surgical history on file.     Current Medications:   Current Outpatient Medications   Medication Sig Dispense Refill    acetaminophen (TYLENOL) 500 MG tablet Take 2 tablets by mouth 3 times daily 30 tablet 0    ibuprofen (ADVIL;MOTRIN) 800 MG tablet Take 1 tablet by mouth 2 times daily as needed for Pain 30 tablet 0     Current Facility-Administered Medications   Medication Dose Route Frequency Provider Last Rate Last Admin    medroxyPROGESTERone (DEPO-PROVERA) injection 150 mg  150 mg IntraMUSCular Q3 Months Lisbet Flannery DO   150 mg at 12/07/15 1609    medroxyPROGESTERone (DEPO-PROVERA) injection 150 mg  150 mg IntraMUSCular See Admin Instructions Korina Lepe DO   150 mg at 04/08/15 1132     Allergies:    Patient has no known allergies. Social History:   Social History     Socioeconomic History    Marital status: Single     Spouse name: Not on file    Number of children: Not on file    Years of education: Not on file    Highest education level: Not on file   Occupational History    Not on file   Tobacco Use    Smoking status: Never Smoker    Smokeless tobacco: Never Used   Vaping Use    Vaping Use: Some days   Substance and Sexual Activity    Alcohol use: No    Drug use: No    Sexual activity: Not Currently     Partners: Male   Other Topics Concern    Not on file   Social History Narrative    Not on file     Social Determinants of Health     Financial Resource Strain:     Difficulty of Paying Living Expenses:    Food Insecurity:     Worried About Running Out of Food in the Last Year:     [address] in the Last Year:    Transportation Needs:     Lack of Transportation (Medical):      Lack of Transportation (Non-Medical):    Physical Activity:     Days of Exercise per Week:     Minutes of Exercise per Session:    Stress:     Feeling of Stress :    Social Connections:     Frequency of Communication with Friends and Family:     Frequency of Social Gatherings with Friends and Family:     Attends Christian Services:     Active Member of Clubs or Organizations:     Attends Club or Organization Meetings:     Marital Status:    Intimate Partner Violence:     Fear of Current or Ex-Partner:     Emotionally Abused:     Physically Abused:     Sexually Abused:      Family History:  Family History   Problem Relation Age of Onset    Diabetes Maternal Grandmother     Hypertension Maternal Grandmother     Hypertension Mother     Breast Cancer Neg Hx     Cancer Neg Hx     Colon Cancer Neg Hx     Eclampsia Neg Hx     Ovarian Cancer Neg Hx      Labor Neg Hx     Spont Abortions Neg Hx     Stroke Neg Hx      REVIEW OF SYSTEMS:    Constitutional: Negative for fever and chills. HENT: Negative for congestion or drainage   Eyes: Negative for blurred vision and double vision. Respiratory: Negative for cough, shortnessof breath and wheezing. Cardiovascular: Negative for chest pain and palpitations. Gastrointestinal: Negative for nausea. Negative for vomiting. Musculoskeletal: Positive for myalgias and joint pain right knee. Skin:Negative for itching and rash. Neurological: Negative for dizziness, sensory change and headaches. Psychiatric/Behavioral: Negative for depression and suicidal ideas. PHYSICAL EXAM:  Ht 5' 5\" (1.651 m)   Wt 274 lb (124.3 kg)   LMP 09/12/2021   BMI 45.60 kg/m²   Physical Exam  Gen: alert and oriented  Psych:  Appropriate affect; Appropriate knowledge base; Appropriate mood; No hallucinations; Head: normocephalic atraumatic   Cardiovascular:Regular rate, no dependent edema, distal pulses 2+  Respiratory: Chest symmetric, no accessory muscle use, normal respirations  Ortho Exam  RLE - no significant swelling or effusion appreciated to the right knee. No TTP to the lateral epicondyle, LCL, or fibular head. No TTP to the quadriceps tendon, patella, or patellar tendon. TTP over the medial epicondyle and MCL. No TTP over the anterior medial or lateral joint lines. ROM from 0-100 degrees. Lachman 1A with patient resisting. Valgus stress test shows mild increased gapping compared to the contralateral side. Patient guards with Nader's but there is no painful popping or clicking in the joint, patient endorses pain over the MCL. Distal neurovascular status intact grossly. Radiology:     Radiographs from the Emergency department demonstrate no acute fracture, dislocation or osseous abnormalities. ASSESSMENT:     1. Sprain of medial collateral ligament of right knee, initial encounter         PLAN:  1. Discussed the radiographic findings, as well as the history and natural etiology of MCL sprains.  The patient demonstrates no significant gapping or instability on exam and therefore this can be treated conservatively. 2. Patient was offered evaluation by physical therapy to work on knee range of motion and strengthening as well as an ecomony knee brace. Patient accepted both. We encouraged continued elevation, ice, and anti-inflammatory medications for pain and swelling relief. 3. We will see the patient back in 4 weeks for repeat evaluation if the patient continues to have issues. If she is doing well she may cancel and follow up as needed. 4. All questions were answered to her satisfaction, she is aware, understands, and voices her willingness to proceed as discussed. Return in about 4 weeks (around 11/4/2021) for Evaluation without X-rays. No orders of the defined types were placed in this encounter. Orders Placed This Encounter   Procedures   [address] [facility]     Referral Priority:   Routine     Referral Type:   Eval and Treat     Referral Reason:   Specialty Services Required     Requested Specialty:   Physical Therapy     Number of Visits Requested:   1     Reviewed Subjective section with patient who did agree and confirmed everything documented. Discussed plan and patient expressed understanding of diagnosis andprognosis with plan as stated. All questions answered.      Megan Hardy DO   Orthopedic Surgery Resident PGY-5  [facility]

## 2022-02-07 ENCOUNTER — APPOINTMENT (OUTPATIENT)
Dept: GENERAL RADIOLOGY | Age: 27
End: 2022-02-07
Payer: MEDICAID

## 2022-02-07 ENCOUNTER — HOSPITAL ENCOUNTER (EMERGENCY)
Age: 27
Discharge: HOME | End: 2022-02-07
Attending: EMERGENCY MEDICINE
Payer: MEDICAID

## 2022-02-07 VITALS
TEMPERATURE: 97.5 F | WEIGHT: 220 LBS | BODY MASS INDEX: 36.65 KG/M2 | DIASTOLIC BLOOD PRESSURE: 102 MMHG | SYSTOLIC BLOOD PRESSURE: 145 MMHG | HEIGHT: 65 IN | RESPIRATION RATE: 18 BRPM | OXYGEN SATURATION: 99 % | HEART RATE: 85 BPM

## 2022-02-07 DIAGNOSIS — S61.210A LACERATION OF RIGHT INDEX FINGER WITHOUT FOREIGN BODY WITHOUT DAMAGE TO NAIL, INITIAL ENCOUNTER: ICD-10-CM

## 2022-02-07 DIAGNOSIS — S61.411A LACERATION OF PALM, RIGHT, INITIAL ENCOUNTER: Primary | ICD-10-CM

## 2022-02-07 PROCEDURE — 90471 IMMUNIZATION ADMIN: CPT

## 2022-02-07 PROCEDURE — 12001 RPR S/N/AX/GEN/TRNK 2.5CM/<: CPT

## 2022-02-07 PROCEDURE — 6370000000 HC RX 637 (ALT 250 FOR IP): Performed by: STUDENT IN AN ORGANIZED HEALTH CARE EDUCATION/TRAINING PROGRAM

## 2022-02-07 PROCEDURE — 96372 THER/PROPH/DIAG INJ SC/IM: CPT

## 2022-02-07 PROCEDURE — 90715 TDAP VACCINE 7 YRS/> IM: CPT

## 2022-02-07 PROCEDURE — 73130 X-RAY EXAM OF HAND: CPT

## 2022-02-07 PROCEDURE — 6360000002 HC RX W HCPCS

## 2022-02-07 PROCEDURE — 99285 EMERGENCY DEPT VISIT HI MDM: CPT

## 2022-02-07 PROCEDURE — 6360000002 HC RX W HCPCS: Performed by: STUDENT IN AN ORGANIZED HEALTH CARE EDUCATION/TRAINING PROGRAM

## 2022-02-07 RX ORDER — KETOROLAC TROMETHAMINE 30 MG/ML
30 INJECTION, SOLUTION INTRAMUSCULAR; INTRAVENOUS ONCE
Status: COMPLETED | OUTPATIENT
Start: 2022-02-07 | End: 2022-02-07

## 2022-02-07 RX ORDER — ACETAMINOPHEN 500 MG
1000 TABLET ORAL EVERY 6 HOURS PRN
Qty: 60 TABLET | Refills: 0 | Status: SHIPPED | OUTPATIENT
Start: 2022-02-07

## 2022-02-07 RX ORDER — IBUPROFEN 200 MG
600 TABLET ORAL EVERY 6 HOURS PRN
Qty: 60 TABLET | Refills: 0 | Status: SHIPPED | OUTPATIENT
Start: 2022-02-07

## 2022-02-07 RX ORDER — ACETAMINOPHEN 500 MG
1000 TABLET ORAL ONCE
Status: COMPLETED | OUTPATIENT
Start: 2022-02-07 | End: 2022-02-07

## 2022-02-07 RX ADMIN — TETANUS TOXOID, REDUCED DIPHTHERIA TOXOID AND ACELLULAR PERTUSSIS VACCINE, ADSORBED 0.5 ML: 5; 2.5; 8; 8; 2.5 SUSPENSION INTRAMUSCULAR at 09:45

## 2022-02-07 RX ADMIN — KETOROLAC TROMETHAMINE 30 MG: 30 INJECTION, SOLUTION INTRAMUSCULAR at 09:39

## 2022-02-07 RX ADMIN — ACETAMINOPHEN 1000 MG: 500 TABLET ORAL at 09:39

## 2022-02-07 ASSESSMENT — PAIN SCALES - GENERAL
PAINLEVEL_OUTOF10: 10
PAINLEVEL_OUTOF10: 10
PAINLEVEL_OUTOF10: 0
PAINLEVEL_OUTOF10: 0

## 2022-02-07 ASSESSMENT — PAIN DESCRIPTION - LOCATION: LOCATION: HAND

## 2022-02-07 ASSESSMENT — PAIN DESCRIPTION - PAIN TYPE: TYPE: ACUTE PAIN

## 2022-02-07 ASSESSMENT — PAIN DESCRIPTION - ORIENTATION: ORIENTATION: RIGHT

## 2022-02-07 NOTE — ED PROVIDER NOTES
[facility]     Emergency Department     Faculty Attestation    I performed a history and physical examination of the patient and discussed management with the resident. I have reviewed and agree with the residents findings including all diagnostic interpretations, and treatment plans as written. Any areas of disagreement are noted on the chart. I was personally present for the key portions of any procedures. I have documented in the chart those procedures where I was not present during the key portions. I have reviewed the emergency nurses triage note. I agree with the chief complaint, past medical history, past surgical history, allergies, medications, social and family history as documented unless otherwise noted below. Documentation of the HPI, Physical Exam and Medical Decision Making performed by linnette is based on my personal performance of the HPI, PE and MDM. For Physician Assistant/ Nurse Practitioner cases/documentation I have personally evaluated this patient and have completed at least one if not all key elements of the E/M (history, physical exam, and MDM). Additional findings are as noted. Patient right-hand dominant. States that her right leg gives out from time to time. She was walking down her porch when she fell, and sustained a laceration to her right hand. Patient came in for evaluation she does not know her tetanus status, does complain of some numbness to the lower aspect of her small finger. Patient on exam awake alert speaking full sentences no distress. She has a 1 inch laceration on the palmar aspect of her right hand at the base of the small finger. Patient is able to flex and extend fully at the small finger with sensation to light touch. At the tip she does describe some decrease sensation to the ulnar aspect of the small finger.   The second laceration is also noted to the pointer finger of the right hand, along the PIP joint. On the radial aspect approximately 1 cm in length. Patient is able to fully flex and extend with 5 out of 5 motor strength, sensation is intact. Capillary refill is less than 2 seconds. Patient with 2 lacerations. We will plan on x-ray imaging. No foreign body visualized. We will plan on wound exploration once local anesthetic is applied. Suture repair, update tetanus.     Alicia Olea D.O, M.P.H  Attending Emergency Medicine Physician         Alicia Olea DO  02/07/22 1012

## 2022-02-07 NOTE — Clinical Note
Codi Barlow was seen and treated in our emergency department on 2/7/2022. She may return to work on 02/09/2022. If you have any questions or concerns, please don't hesitate to call.       Sultana Pichardo MD

## 2022-02-07 NOTE — ED PROVIDER NOTES
[facility]  ED  Emergency Department Encounter  Emergency Medicine Resident     Pt Name:Codi Montalvo  MRN: [id number]  Birthdate 1995  Date of evaluation: 2/7/22  PCP:  Alexandra Rendon MD    [address] [place] [address]       Chief Complaint   Patient presents with    Laceration     rt hand       HISTORY OF PRESENT ILLNESS  (Location/Symptom, Timing/Onset, Context/Setting, Quality, Duration, Modifying Factors, Severity.)      Mercedes Piper is a 32 y.o. female who presents with acute onset right hand pain and bleeding after lacerating the palmar aspect of the right pinky and index finger when falling on an outstretched arm. Patient believes she cut her self on ice. She does note she landed on a vase over the base did not break. Patient notes there is some decreased sensation over the medial aspect of the pinky but has been able to move her hand and all fingers without difficulty. Patient takes no blood thinners, did not hit her head, no loss of consciousness. Denies any other injuries or complaints. Denies pain at the wrist elbow shoulder or anywhere else on the extremity. Patient is left-handed, injury to nondominant hand. PAST MEDICAL / SURGICAL / SOCIAL / FAMILY HISTORY      has a past medical history of Anemia, Hypertension, and Trauma. has no past surgical history on file. No pertinent surgical history on review with patient/family.     Social History     Socioeconomic History    Marital status: Single     Spouse name: Not on file    Number of children: Not on file    Years of education: Not on file    Highest education level: Not on file   Occupational History    Not on file   Tobacco Use    Smoking status: Never Smoker    Smokeless tobacco: Never Used   Vaping Use    Vaping Use: Some days   Substance and Sexual Activity    Alcohol use: No    Drug use: No    Sexual activity: Not Currently     Partners: Male   Other Topics Concern    Not on file   Social History Narrative    Not on file     Social Determinants of Health     Financial Resource Strain:     Difficulty of Paying Living Expenses: Not on file   Food Insecurity:     Worried About Running Out of Food in the Last Year: Not on file    Titus of Food in the Last Year: Not on file   Transportation Needs:     Lack of Transportation (Medical): Not on file    Lack of Transportation (Non-Medical): Not on file   Physical Activity:     Days of Exercise per Week: Not on file    Minutes of Exercise per Session: Not on file   Stress:     Feeling of Stress : Not on file   Social Connections:     Frequency of Communication with Friends and Family: Not on file    Frequency of Social Gatherings with Friends and Family: Not on file    Attends Taoism Services: Not on file    Active Member of [address] [place] [address] [employer] or Organizations: Not on file    Attends Club or Organization Meetings: Not on file    Marital Status: Not on file   Intimate Partner Violence:     Fear of Current or Ex-Partner: Not on file    Emotionally Abused: Not on file    Physically Abused: Not on file    Sexually Abused: Not on file   Housing Stability:     Unable to Pay for Housing in the Last Year: Not on file    Number of [place] in the Last Year: Not on file    Unstable Housing in the Last Year: Not on file       Family History   Problem Relation Age of Onset    Diabetes Maternal Grandmother     Hypertension Maternal Grandmother     Hypertension Mother     Breast Cancer Neg Hx     Cancer Neg Hx     Colon Cancer Neg Hx     Eclampsia Neg Hx     Ovarian Cancer Neg Hx      Labor Neg Hx     Spont Abortions Neg Hx     Stroke Neg Hx        Allergies:  Patient has no known allergies. Home Medications:  Prior to Admission medications    Medication Sig Start Date End Date Taking?  Authorizing Provider   acetaminophen (TYLENOL) 500 MG tablet Take 2 tablets by mouth every 6 hours as needed for Pain 22  Yes Carlyle Arevalo MD   ibuprofen (ADVIL;MOTRIN) 200 MG tablet Take 3 tablets by mouth every 6 hours as needed for Pain 2/7/22  Yes Delgado Mckoy MD       REVIEW OF SYSTEMS    (2-9 systems for level 4, 10 or more for level 5)      Review of Systems   Constitutional: Negative for chills and fever. Respiratory: Negative for shortness of breath. Cardiovascular: Negative for chest pain. Gastrointestinal: Negative for abdominal pain, nausea and vomiting. Musculoskeletal: Negative for arthralgias and myalgias. Skin: Positive for wound. Neurological: Positive for numbness. Negative for weakness, light-headedness and headaches. Psychiatric/Behavioral: Negative for behavioral problems. PHYSICAL EXAM   (up to 7 for level 4, 8 or more for level 5)      INITIAL VITALS:   BP (!) 145/102   Pulse 85   Temp 97.5 °F (36.4 °C) (Oral)   Resp 18   Ht 5' 5\" (1.651 m)   Wt 220 lb (99.8 kg)   LMP 01/10/2022   SpO2 99%   BMI 36.61 kg/m²     Physical Exam  Vitals and nursing note reviewed. Constitutional:       General: She is not in acute distress. Appearance: Normal appearance. She is well-developed. She is not diaphoretic. HENT:      Head: Normocephalic and atraumatic. Right Ear: External ear normal.      Left Ear: External ear normal.      Nose: Nose normal.      Mouth/Throat:      Pharynx: Uvula midline. Cardiovascular:      Rate and Rhythm: Normal rate and regular rhythm. Heart sounds: Normal heart sounds. Pulmonary:      Effort: Pulmonary effort is normal. No respiratory distress. Abdominal:      Palpations: Abdomen is soft. Tenderness: There is no abdominal tenderness. Musculoskeletal:         General: Normal range of motion. Cervical back: Normal range of motion. Comments: Laceration on the palmar aspect of the fifth MCP joint with flexion and inspection intact, no obvious muscle body/tendon/osseous/joint involvement.   Decreased sensation over the medial aspect, no spinal sensation on lateral aspect of the pinky. Dorsal index finger laceration over the PIP joint with flexion extension intact, no obvious ligamentous or obvious injury without obvious involvement of the joint. Skin:     General: Skin is warm and dry. Capillary Refill: Capillary refill takes less than 2 seconds. Neurological:      General: No focal deficit present. Mental Status: She is alert and oriented to person, place, and time. Cranial Nerves: No cranial nerve deficit. Sensory: Sensory deficit (Medial aspect pinky) present. Motor: No weakness. Psychiatric:         Behavior: Behavior normal.         DIFFERENTIAL  DIAGNOSIS     PLAN (LABS / IMAGING / EKG):  Orders Placed This Encounter   Procedures    LACERATION REPAIR    XR HAND RIGHT (MIN 3 VIEWS)       MEDICATIONS ORDERED:  Orders Placed This Encounter   Medications    acetaminophen (TYLENOL) tablet 1,000 mg    ketorolac (TORADOL) injection 30 mg    Tetanus-Diphth-Acell Pertussis (BOOSTRIX) injection 0.5 mL    Tetanus-Diphth-Acell Pertussis (BOOSTRIX) 5-2.5-18.5 LF-MCG/0.5 injection     Lucie Riceke: cabinet override    acetaminophen (TYLENOL) 500 MG tablet     Sig: Take 2 tablets by mouth every 6 hours as needed for Pain     Dispense:  60 tablet     Refill:  0    ibuprofen (ADVIL;MOTRIN) 200 MG tablet     Sig: Take 3 tablets by mouth every 6 hours as needed for Pain     Dispense:  60 tablet     Refill:  0       DDX: Laceration with or without joint involvement with without tendon involvement with without contamination    DIAGNOSTIC RESULTS / [address] [place] [address] / [facility]     LABS:  No results found for this visit on 02/07/22. RADIOLOGY:  XR HAND RIGHT (MIN 3 VIEWS)    Result Date: 2/7/2022  EXAMINATION: THREE XRAY VIEWS OF THE RIGHT HAND 2/7/2022 9:27 am COMPARISON: None.  HISTORY: ORDERING SYSTEM PROVIDED HISTORY: Fall with laceration to palmar aspect fifth MCP and lateral aspect second digit PIP TECHNOLOGIST PROVIDED HISTORY: Fall with laceration to palmar aspect fifth MCP and lateral aspect second digit PIP FINDINGS: Mineralization and alignment are satisfactory. No acute fracture, dislocation or radiopaque foreign body is noted navicular lunate space is well-preserved. No ulnar minus variance is noted. No osseous abnormality. No radiopaque foreign body. EKG  None    All EKG's are interpreted by the Emergency Department Physician who either signs or Co-signs this chart in the absence of a cardiologist.    EMERGENCY DEPARTMENT COURSE:  Patient on seated upright in bed, no acute distress, not ill or toxic appearing. Engaged in cooperative exam.  Physical exam notable for laceration as described above, image below. Flexion extension of the digits against resistance intact and at each isolated joint. Copious irrigation, repaired as below. Tetanus updated. Dorsal splint applied to the pinky to prevent extreme flexion and excessive tension on the wound. Patient to follow-up in the ED or with PCP for wound check and suture removal.  Discharge plan discussed with patient who is in agreement. Educated on likely pathology, medications, return precautions, and follow-up. Patient understood all educated materials with all questions answered to their satisfaction. PROCEDURES:  Lac Repair    Date/Time: 2/7/2022 5:31 PM  Performed by: Delgado Mckoy MD  Authorized by: Amaury Celis DO     Consent:     Consent obtained:  Verbal    Consent given by:  Patient    Risks discussed:  Infection, need for additional repair, nerve damage, pain, poor cosmetic result, poor wound healing, retained foreign body and tendon damage    Alternatives discussed:  No treatment  Anesthesia (see MAR for exact dosages):      Anesthesia method:  Local infiltration    Local anesthetic:  Lidocaine 1% w/o epi  Laceration details:     Location:  Hand (And 2 cm index finger laceration)    Hand location:  R palm    Length (cm):  3    Depth (mm):  10  Repair type:     Repair type: Intermediate  Pre-procedure details:     Preparation:  Patient was prepped and draped in usual sterile fashion and imaging obtained to evaluate for foreign bodies  Exploration:     Hemostasis achieved with:  Direct pressure    Wound extent: no foreign bodies/material noted, no muscle damage noted, no tendon damage noted, no underlying fracture noted and no vascular damage noted      Contaminated: no    Treatment:     Area cleansed with:  Soap and water    Amount of cleaning:  Extensive    Irrigation solution:  Tap water    Irrigation volume:  20 minutes    Irrigation method:  Tap  Skin repair:     Repair method:  Sutures    Suture size:  5-0    Suture material:  Prolene    Suture technique:  Simple interrupted    Number of sutures:  18  Approximation:     Approximation:  Close  Post-procedure details:     Dressing:  Antibiotic ointment and splint for protection    Patient tolerance of procedure: Tolerated well, no immediate complications          CONSULTS:  None    CRITICAL CARE:  None    FINAL IMPRESSION      1. Laceration of palm, right, initial encounter    2.  Laceration of right index finger without foreign body without damage to nail, initial encounter          DISPOSITION / PLAN     DISPOSITION Decision To Discharge 02/07/2022 12:20:36 PM      PATIENT REFERRED TO:  Evita Cook MD  [address] [place] [address]  [phone]    Schedule an appointment as soon as possible for a visit   Regarding this visit    [facility]  [address] [place] [address]  [phone]  Go to   If symptoms worsen      DISCHARGE MEDICATIONS:  Discharge Medication List as of 2/7/2022 12:32 PM          Carlyle Arevalo MD  Emergency Medicine Resident    (Please note that portions of this note were completed with a voice recognition program.  Efforts were made to edit the dictations but occasionally words are mis-transcribed.)        Carlyle Arevalo MD  Resident  02/09/22 [address]

## 2022-02-07 NOTE — ED NOTES
Pt resting comfortably call light in reach denies needs at this time     Seldovia VIKTOR Lopez  02/07/22 1777

## 2022-02-09 ASSESSMENT — ENCOUNTER SYMPTOMS
ABDOMINAL PAIN: 0
NAUSEA: 0
SHORTNESS OF BREATH: 0
VOMITING: 0

## 2022-02-17 ENCOUNTER — HOSPITAL ENCOUNTER (EMERGENCY)
Age: 27
Discharge: HOME | End: 2022-02-17
Attending: EMERGENCY MEDICINE
Payer: MEDICAID

## 2022-02-17 VITALS
OXYGEN SATURATION: 97 % | RESPIRATION RATE: 12 BRPM | TEMPERATURE: 97.5 F | HEART RATE: 75 BPM | SYSTOLIC BLOOD PRESSURE: 132 MMHG | DIASTOLIC BLOOD PRESSURE: 48 MMHG

## 2022-02-17 DIAGNOSIS — Z48.02 VISIT FOR SUTURE REMOVAL: Primary | ICD-10-CM

## 2022-02-17 PROCEDURE — 99283 EMERGENCY DEPT VISIT LOW MDM: CPT

## 2022-02-17 ASSESSMENT — ENCOUNTER SYMPTOMS
VOMITING: 0
NAUSEA: 0

## 2022-02-17 NOTE — ED NOTES
Bed: 35  Expected date:   Expected time:   Means of arrival:   Comments:     Kassidy Boudreaux RN  02/17/22 [id number]

## 2022-02-17 NOTE — ED PROVIDER NOTES
[address] [facility]     Emergency Department     Faculty Attestation    I performed a history and physical examination of the patient and discussed management with the resident. I have reviewed and agree with the residents findings including all diagnostic interpretations, and treatment plans as written at the time of my review. Any areas of disagreement are noted on the chart. I was personally present for the key portions of any procedures. I have documented in the chart those procedures where I was not present during the key portions. For Physician Assistant/ Nurse Practitioner cases/documentation I have personally evaluated this patient and have completed at least one if not all key elements of the E/M (history, physical exam, and MDM). Additional findings are as noted. This patient was evaluated in the Emergency Department for symptoms described in the history of present illness. The patient was evaluated in the context of the global COVID-19 pandemic, which necessitated consideration that the patient might be at risk for infection with the SARS-CoV-2 virus that causes COVID-19. Institutional protocols and algorithms that pertain to the evaluation of patients at risk for COVID-19 are in a state of rapid change based on information released by regulatory bodies including the CDC and federal and state organizations. These policies and algorithms were followed during the patient's care in the ED. Primary Care Physician: Faustino Hodgkins, MD    History: This is a 32 y.o. female who presents to the Emergency Department with complaint of suture removal.  The patient's had sutures placed on February 7.  18 sutures were placed at that time. Physical:   oral temperature is 97.5 °F (36.4 °C). Her blood pressure is 132/48 (abnormal) and her pulse is 75. Her respiration is 12 and oxygen saturation is 97%. Well-healing wound on the finger and the hand.   There is no erythema warmth or discharge noted. Impression: Suture removal    Plan: Suture removal    (Please note that portions of this note were completed with a voice recognition program.  Efforts were made to edit the dictations but occasionally words are mis-transcribed.)    Jose Farooq.  Josey Quinones MD, [facility]  Attending Emergency Medicine Physician        Ysabel Norton MD  02/17/22 1025

## 2022-02-18 NOTE — ED PROVIDER NOTES
[facility]  ED  Emergency Department Encounter  Emergency Medicine Resident     Pt Name:Codi Carlson  MRN: [id number]  Birthdate 1995  Date of evaluation: 2/17/22  PCP:  No primary care provider on file. CHIEF COMPLAINT       Chief Complaint   Patient presents with    Suture / Staple Removal     placed last Monday per pt, right hand-site WNL       HISTORY OF PRESENT ILLNESS  (Location/Symptom, Timing/Onset, Context/Setting, Quality, Duration, Modifying Factors, Severity.)      Codi Barlow is a 32 y.o. female who presents with need for suture removal. Healing well, no s/s of local or systemic infection. No neuro or functional deficits. Pain improved. PAST MEDICAL / SURGICAL / SOCIAL / FAMILY HISTORY      has a past medical history of Anemia, Hypertension, and Trauma. has no past surgical history on file. Social History     Socioeconomic History    Marital status: Single     Spouse name: Not on file    Number of children: Not on file    Years of education: Not on file    Highest education level: Not on file   Occupational History    Not on file   Tobacco Use    Smoking status: Never Smoker    Smokeless tobacco: Never Used   Vaping Use    Vaping Use: Some days   Substance and Sexual Activity    Alcohol use: No    Drug use: No    Sexual activity: Not Currently     Partners: Male   Other Topics Concern    Not on file   Social History Narrative    Not on file     Social Determinants of Health     Financial Resource Strain:     Difficulty of Paying Living Expenses: Not on file   Food Insecurity:     Worried About Running Out of Food in the Last Year: Not on file    Titus of Food in the Last Year: Not on file   Transportation Needs:     Lack of Transportation (Medical): Not on file    Lack of Transportation (Non-Medical):  Not on file   Physical Activity:     Days of Exercise per Week: Not on file    Minutes of Exercise per Session: Not on file   Stress:     Feeling of Stress : Not on file   Social Connections:     Frequency of Communication with Friends and Family: Not on file    Frequency of Social Gatherings with Friends and Family: Not on file    Attends Buddhist Services: Not on file    Active Member of Clubs or Organizations: Not on file    Attends Club or Organization Meetings: Not on file    Marital Status: Not on file   Intimate Partner Violence:     Fear of Current or Ex-Partner: Not on file    Emotionally Abused: Not on file    Physically Abused: Not on file    Sexually Abused: Not on file   Housing Stability:     Unable to Pay for Housing in the Last Year: Not on file    Number of [place] in the Last Year: Not on file    Unstable Housing in the Last Year: Not on file       Family History   Problem Relation Age of Onset    Diabetes Maternal Grandmother     Hypertension Maternal Grandmother     Hypertension Mother     Breast Cancer Neg Hx     Cancer Neg Hx     Colon Cancer Neg Hx     Eclampsia Neg Hx     Ovarian Cancer Neg Hx      Labor Neg Hx     Spont Abortions Neg Hx     Stroke Neg Hx        Allergies:  Patient has no known allergies. Home Medications:  Prior to Admission medications    Medication Sig Start Date End Date Taking? Authorizing Provider   acetaminophen (TYLENOL) 500 MG tablet Take 2 tablets by mouth every 6 hours as needed for Pain 22   Herve Blevins MD   ibuprofen (ADVIL;MOTRIN) 200 MG tablet Take 3 tablets by mouth every 6 hours as needed for Pain 22   Herve Blevins MD       REVIEW OF SYSTEMS    (2-9 systems for level 4, 10 or more for level 5)      Review of Systems   Constitutional: Negative for fever. Gastrointestinal: Negative for nausea and vomiting. Musculoskeletal: Negative for arthralgias and myalgias. Skin: Positive for wound.        PHYSICAL EXAM   (up to 7 for level 4, 8 or more for level 5)      INITIAL VITALS:   BP (!) 132/48   Pulse 75   Temp 97.5 °F (36.4 °C) (Oral) Resp 12   SpO2 97%     Physical Exam  Vitals and nursing note reviewed. Constitutional:       General: She is not in acute distress. Appearance: Normal appearance. She is well-developed. She is not diaphoretic. HENT:      Head: Normocephalic and atraumatic. Right Ear: External ear normal.      Left Ear: External ear normal.      Mouth/Throat:      Pharynx: Uvula midline. Cardiovascular:      Rate and Rhythm: Normal rate and regular rhythm. Heart sounds: Normal heart sounds. Pulmonary:      Effort: Pulmonary effort is normal. No respiratory distress. Musculoskeletal:         General: No deformity. Normal range of motion. Cervical back: Normal range of motion. Comments: RUE functionally intactt   Skin:     General: Skin is warm and dry. Capillary Refill: Capillary refill takes less than 2 seconds. Comments: Well healing lacs w/o s/s of infection. Neurological:      Mental Status: She is alert and oriented to person, place, and time. Psychiatric:         Behavior: Behavior normal.           DIFFERENTIAL  DIAGNOSIS     PLAN (LABS / IMAGING / EKG):  Orders Placed This Encounter   Procedures    SUTURE REMOVAL       MEDICATIONS ORDERED:  No orders of the defined types were placed in this encounter. DDX:     DIAGNOSTIC RESULTS / EMERGENCY DEPARTMENT COURSE / MDM     LABS:  No results found for this visit on 02/17/22. RADIOLOGY:  None    EKG  None    All EKG's are interpreted by the Emergency Department Physician who either signs or Co-signs this chart in the absence of a cardiologist.    EMERGENCY DEPARTMENT COURSE:  Healing as expected. 18 sutures removed without difficulty. Plan for PCP f/u PRN and basic wound care.      PROCEDURES:  Suture Removal    Date/Time: 2/17/2022 11:08 PM  Performed by: Delgado Mckoy MD  Authorized by: Kristi Rodriguez MD     Consent:     Consent obtained:  Verbal    Consent given by:  Patient    Risks discussed:  Bleeding and wound separation  Location:     Location:  Upper extremity    Upper extremity location:  Hand    Hand location:  R small finger  Procedure details:     Wound appearance:  No signs of infection, good wound healing, clean, nonpurulent and nontender    Number of sutures removed:  18  Post-procedure details:     Post-removal:  Antibiotic ointment applied    Patient tolerance of procedure: Tolerated well, no immediate complications        CONSULTS:  None    CRITICAL CARE:  None    FINAL IMPRESSION      1.  Visit for suture removal          DISPOSITION / PLAN     DISPOSITION Decision To Discharge 02/17/2022 10:42:13 AM      PATIENT REFERRED TO:  Yady HINES  [address] [place] [address]  [phone]  Schedule an appointment as soon as possible for a visit   To establish care, Regarding this visit    [facility]  [address] [place] [address]  [phone]  Go to   If symptoms worsen      DISCHARGE MEDICATIONS:  Discharge Medication List as of 2/17/2022 10:42 AM          Yumi Biggs MD  Emergency Medicine Resident    (Please note that portions of this note were completed with a voice recognition program.  Efforts were made to edit the dictations but occasionally words are mis-transcribed.)        Yumi Biggs MD  Resident  02/17/22 4943

## 2023-06-06 NOTE — LETTER
[address]  [address] [facility] [address]  Phone: [phone]  Fax: [phone]    Isidra Villalobos MD        July 7, 2021    [address]      Dear Edwards Alvinaminna: This letter is a reminder that you may have diagnostic testing that has not been completed. It is important to your well-being that these test(s) are performed. Please find the outstanding test(s) attached. If you could please have these completed before your next appointment. You can have the test completed at any [facility] facility or Lab. Please see the order for scheduling instructions. Any testing that needs completed other than blood work or xray's please call [phone] to schedule an appointment. Otherwise can be done at any [facility]. Please call our office at Dept: [phone] for additional information on the outstanding tests or let us know if they have been completed so we may update your chart. If you have any questions or concerns, please don't hesitate to call.     Sincerely,        Isidra Villalobos MD [Negative] : Heme/Lymph